# Patient Record
Sex: MALE | Race: WHITE | NOT HISPANIC OR LATINO | URBAN - NONMETROPOLITAN AREA
[De-identification: names, ages, dates, MRNs, and addresses within clinical notes are randomized per-mention and may not be internally consistent; named-entity substitution may affect disease eponyms.]

---

## 2022-03-17 ENCOUNTER — NURSE TRIAGE (OUTPATIENT)
Dept: CALL CENTER | Facility: HOSPITAL | Age: 72
End: 2022-03-17

## 2022-03-18 ENCOUNTER — HOSPITAL ENCOUNTER (INPATIENT)
Facility: HOSPITAL | Age: 72
LOS: 2 days | Discharge: HOME | End: 2022-03-20
Attending: EMERGENCY MEDICINE | Admitting: INTERNAL MEDICINE

## 2022-03-18 ENCOUNTER — APPOINTMENT (OUTPATIENT)
Dept: CT IMAGING | Facility: HOSPITAL | Age: 72
End: 2022-03-18

## 2022-03-18 ENCOUNTER — APPOINTMENT (OUTPATIENT)
Dept: GENERAL RADIOLOGY | Facility: HOSPITAL | Age: 72
End: 2022-03-18

## 2022-03-18 DIAGNOSIS — J96.01 ACUTE RESPIRATORY FAILURE WITH HYPOXIA: Primary | ICD-10-CM

## 2022-03-18 DIAGNOSIS — J10.1 INFLUENZA A: ICD-10-CM

## 2022-03-18 PROBLEM — R65.20 SEVERE SEPSIS: Status: ACTIVE | Noted: 2022-03-18

## 2022-03-18 PROBLEM — C91.10 CHRONIC LYMPHOCYTIC LEUKEMIA: Status: ACTIVE | Noted: 2022-03-18

## 2022-03-18 PROBLEM — A41.9 SEVERE SEPSIS: Status: ACTIVE | Noted: 2022-03-18

## 2022-03-18 LAB
A-A DO2: 33.8 MMHG (ref 0–300)
ALBUMIN SERPL-MCNC: 4.3 G/DL (ref 3.5–5.2)
ALBUMIN/GLOB SERPL: 1.6 G/DL
ALP SERPL-CCNC: 79 U/L (ref 39–117)
ALT SERPL W P-5'-P-CCNC: 22 U/L (ref 1–41)
ANION GAP SERPL CALCULATED.3IONS-SCNC: 13.5 MMOL/L (ref 5–15)
ARTERIAL PATENCY WRIST A: ABNORMAL
AST SERPL-CCNC: 17 U/L (ref 1–40)
ATMOSPHERIC PRESS: 725 MMHG
BASE EXCESS BLDA CALC-SCNC: 2.7 MMOL/L (ref 0–2)
BASOPHILS # BLD AUTO: 0.05 10*3/MM3 (ref 0–0.2)
BASOPHILS NFR BLD AUTO: 0.6 % (ref 0–1.5)
BDY SITE: ABNORMAL
BILIRUB SERPL-MCNC: 0.6 MG/DL (ref 0–1.2)
BILIRUB UR QL STRIP: NEGATIVE
BODY TEMPERATURE: 0 C
BUN SERPL-MCNC: 12 MG/DL (ref 8–23)
BUN/CREAT SERPL: 11.9 (ref 7–25)
CALCIUM SPEC-SCNC: 9.1 MG/DL (ref 8.6–10.5)
CHLORIDE SERPL-SCNC: 98 MMOL/L (ref 98–107)
CLARITY UR: CLEAR
CO2 BLDA-SCNC: 28.7 MMOL/L (ref 22–33)
CO2 SERPL-SCNC: 26.5 MMOL/L (ref 22–29)
COHGB MFR BLD: 1.2 % (ref 0–5)
COLOR UR: YELLOW
CREAT SERPL-MCNC: 1.01 MG/DL (ref 0.76–1.27)
CRP SERPL-MCNC: 8.88 MG/DL (ref 0–0.5)
D DIMER PPP FEU-MCNC: <0.27 MCGFEU/ML (ref 0–0.5)
D-LACTATE SERPL-SCNC: 2.3 MMOL/L (ref 0.5–2)
D-LACTATE SERPL-SCNC: 2.8 MMOL/L (ref 0.5–2)
DEPRECATED RDW RBC AUTO: 45.2 FL (ref 37–54)
EGFRCR SERPLBLD CKD-EPI 2021: 79 ML/MIN/1.73
EOSINOPHIL # BLD AUTO: 0.02 10*3/MM3 (ref 0–0.4)
EOSINOPHIL NFR BLD AUTO: 0.2 % (ref 0.3–6.2)
ERYTHROCYTE [DISTWIDTH] IN BLOOD BY AUTOMATED COUNT: 14.1 % (ref 12.3–15.4)
FLUAV RNA RESP QL NAA+PROBE: DETECTED
FLUBV RNA RESP QL NAA+PROBE: NOT DETECTED
GLOBULIN UR ELPH-MCNC: 2.7 GM/DL
GLUCOSE BLDC GLUCOMTR-MCNC: 268 MG/DL (ref 70–130)
GLUCOSE BLDC GLUCOMTR-MCNC: 279 MG/DL (ref 70–130)
GLUCOSE SERPL-MCNC: 221 MG/DL (ref 65–99)
GLUCOSE UR STRIP-MCNC: ABNORMAL MG/DL
HCO3 BLDA-SCNC: 27.4 MMOL/L (ref 20–26)
HCT VFR BLD AUTO: 42.7 % (ref 37.5–51)
HCT VFR BLD CALC: 43.2 % (ref 38–51)
HGB BLD-MCNC: 14.2 G/DL (ref 13–17.7)
HGB BLDA-MCNC: 14.1 G/DL (ref 14–18)
HGB UR QL STRIP.AUTO: NEGATIVE
HOLD SPECIMEN: NORMAL
HOLD SPECIMEN: NORMAL
IMM GRANULOCYTES # BLD AUTO: 0.04 10*3/MM3 (ref 0–0.05)
IMM GRANULOCYTES NFR BLD AUTO: 0.5 % (ref 0–0.5)
INHALED O2 CONCENTRATION: 21 %
KETONES UR QL STRIP: ABNORMAL
LEUKOCYTE ESTERASE UR QL STRIP.AUTO: NEGATIVE
LYMPHOCYTES # BLD AUTO: 0.44 10*3/MM3 (ref 0.7–3.1)
LYMPHOCYTES NFR BLD AUTO: 5.1 % (ref 19.6–45.3)
Lab: ABNORMAL
MCH RBC QN AUTO: 29.5 PG (ref 26.6–33)
MCHC RBC AUTO-ENTMCNC: 33.3 G/DL (ref 31.5–35.7)
MCV RBC AUTO: 88.8 FL (ref 79–97)
METHGB BLD QL: 0.2 % (ref 0–3)
MODALITY: ABNORMAL
MONOCYTES # BLD AUTO: 0.96 10*3/MM3 (ref 0.1–0.9)
MONOCYTES NFR BLD AUTO: 11.2 % (ref 5–12)
NEUTROPHILS NFR BLD AUTO: 7.06 10*3/MM3 (ref 1.7–7)
NEUTROPHILS NFR BLD AUTO: 82.4 % (ref 42.7–76)
NITRITE UR QL STRIP: NEGATIVE
NOTE: ABNORMAL
NRBC BLD AUTO-RTO: 0 /100 WBC (ref 0–0.2)
OXYHGB MFR BLDV: 91.4 % (ref 94–99)
PCO2 BLDA: 41.5 MM HG (ref 35–45)
PCO2 TEMP ADJ BLD: ABNORMAL MM[HG]
PH BLDA: 7.43 PH UNITS (ref 7.35–7.45)
PH UR STRIP.AUTO: 5.5 [PH] (ref 5–8)
PH, TEMP CORRECTED: ABNORMAL
PLATELET # BLD AUTO: 140 10*3/MM3 (ref 140–450)
PMV BLD AUTO: 9.2 FL (ref 6–12)
PO2 BLDA: 61.8 MM HG (ref 83–108)
PO2 TEMP ADJ BLD: ABNORMAL MM[HG]
POTASSIUM SERPL-SCNC: 3.7 MMOL/L (ref 3.5–5.2)
PROCALCITONIN SERPL-MCNC: 0.11 NG/ML (ref 0–0.25)
PROT SERPL-MCNC: 7 G/DL (ref 6–8.5)
PROT UR QL STRIP: NEGATIVE
QT INTERVAL: 300 MS
QTC INTERVAL: 433 MS
RBC # BLD AUTO: 4.81 10*6/MM3 (ref 4.14–5.8)
SAO2 % BLDCOA: 92.7 % (ref 94–99)
SARS-COV-2 RNA RESP QL NAA+PROBE: NOT DETECTED
SODIUM SERPL-SCNC: 138 MMOL/L (ref 136–145)
SP GR UR STRIP: >1.03 (ref 1–1.03)
TROPONIN T SERPL-MCNC: <0.01 NG/ML (ref 0–0.03)
UROBILINOGEN UR QL STRIP: ABNORMAL
VENTILATOR MODE: ABNORMAL
WBC NRBC COR # BLD: 8.57 10*3/MM3 (ref 3.4–10.8)
WHOLE BLOOD HOLD SPECIMEN: NORMAL
WHOLE BLOOD HOLD SPECIMEN: NORMAL

## 2022-03-18 PROCEDURE — 81003 URINALYSIS AUTO W/O SCOPE: CPT | Performed by: PHYSICIAN ASSISTANT

## 2022-03-18 PROCEDURE — 83050 HGB METHEMOGLOBIN QUAN: CPT

## 2022-03-18 PROCEDURE — 36415 COLL VENOUS BLD VENIPUNCTURE: CPT

## 2022-03-18 PROCEDURE — 87040 BLOOD CULTURE FOR BACTERIA: CPT | Performed by: STUDENT IN AN ORGANIZED HEALTH CARE EDUCATION/TRAINING PROGRAM

## 2022-03-18 PROCEDURE — 83605 ASSAY OF LACTIC ACID: CPT | Performed by: EMERGENCY MEDICINE

## 2022-03-18 PROCEDURE — 83605 ASSAY OF LACTIC ACID: CPT | Performed by: STUDENT IN AN ORGANIZED HEALTH CARE EDUCATION/TRAINING PROGRAM

## 2022-03-18 PROCEDURE — 25010000002 HEPARIN (PORCINE) PER 1000 UNITS: Performed by: PHYSICIAN ASSISTANT

## 2022-03-18 PROCEDURE — 85379 FIBRIN DEGRADATION QUANT: CPT | Performed by: NURSE PRACTITIONER

## 2022-03-18 PROCEDURE — 85025 COMPLETE CBC W/AUTO DIFF WBC: CPT | Performed by: STUDENT IN AN ORGANIZED HEALTH CARE EDUCATION/TRAINING PROGRAM

## 2022-03-18 PROCEDURE — 84145 PROCALCITONIN (PCT): CPT | Performed by: NURSE PRACTITIONER

## 2022-03-18 PROCEDURE — 94660 CPAP INITIATION&MGMT: CPT

## 2022-03-18 PROCEDURE — 84484 ASSAY OF TROPONIN QUANT: CPT | Performed by: NURSE PRACTITIONER

## 2022-03-18 PROCEDURE — 99284 EMERGENCY DEPT VISIT MOD MDM: CPT

## 2022-03-18 PROCEDURE — 94799 UNLISTED PULMONARY SVC/PX: CPT

## 2022-03-18 PROCEDURE — 25010000002 METHYLPREDNISOLONE PER 125 MG: Performed by: NURSE PRACTITIONER

## 2022-03-18 PROCEDURE — 80053 COMPREHEN METABOLIC PANEL: CPT | Performed by: STUDENT IN AN ORGANIZED HEALTH CARE EDUCATION/TRAINING PROGRAM

## 2022-03-18 PROCEDURE — 71250 CT THORAX DX C-: CPT

## 2022-03-18 PROCEDURE — 86140 C-REACTIVE PROTEIN: CPT | Performed by: NURSE PRACTITIONER

## 2022-03-18 PROCEDURE — 94640 AIRWAY INHALATION TREATMENT: CPT

## 2022-03-18 PROCEDURE — 63710000001 INSULIN ASPART PER 5 UNITS: Performed by: PHYSICIAN ASSISTANT

## 2022-03-18 PROCEDURE — 71045 X-RAY EXAM CHEST 1 VIEW: CPT

## 2022-03-18 PROCEDURE — 87636 SARSCOV2 & INF A&B AMP PRB: CPT | Performed by: STUDENT IN AN ORGANIZED HEALTH CARE EDUCATION/TRAINING PROGRAM

## 2022-03-18 PROCEDURE — 82962 GLUCOSE BLOOD TEST: CPT

## 2022-03-18 PROCEDURE — 99223 1ST HOSP IP/OBS HIGH 75: CPT | Performed by: PHYSICIAN ASSISTANT

## 2022-03-18 PROCEDURE — 93005 ELECTROCARDIOGRAM TRACING: CPT | Performed by: EMERGENCY MEDICINE

## 2022-03-18 PROCEDURE — 82375 ASSAY CARBOXYHB QUANT: CPT

## 2022-03-18 PROCEDURE — 93005 ELECTROCARDIOGRAM TRACING: CPT | Performed by: STUDENT IN AN ORGANIZED HEALTH CARE EDUCATION/TRAINING PROGRAM

## 2022-03-18 PROCEDURE — 82805 BLOOD GASES W/O2 SATURATION: CPT

## 2022-03-18 PROCEDURE — 36600 WITHDRAWAL OF ARTERIAL BLOOD: CPT

## 2022-03-18 RX ORDER — PANTOPRAZOLE SODIUM 40 MG/1
40 TABLET, DELAYED RELEASE ORAL DAILY
Status: CANCELLED | OUTPATIENT
Start: 2022-03-18

## 2022-03-18 RX ORDER — HEPARIN SODIUM 5000 [USP'U]/ML
5000 INJECTION, SOLUTION INTRAVENOUS; SUBCUTANEOUS EVERY 12 HOURS SCHEDULED
Status: DISCONTINUED | OUTPATIENT
Start: 2022-03-18 | End: 2022-03-20 | Stop reason: HOSPADM

## 2022-03-18 RX ORDER — AMLODIPINE BESYLATE 5 MG/1
10 TABLET ORAL DAILY
Status: CANCELLED | OUTPATIENT
Start: 2022-03-18

## 2022-03-18 RX ORDER — HYDROCHLOROTHIAZIDE 12.5 MG/1
12.5 TABLET ORAL DAILY
COMMUNITY
End: 2022-03-20 | Stop reason: HOSPADM

## 2022-03-18 RX ORDER — GABAPENTIN 300 MG/1
300 CAPSULE ORAL NIGHTLY
Status: DISCONTINUED | OUTPATIENT
Start: 2022-03-18 | End: 2022-03-20 | Stop reason: HOSPADM

## 2022-03-18 RX ORDER — DULOXETIN HYDROCHLORIDE 60 MG/1
60 CAPSULE, DELAYED RELEASE ORAL
COMMUNITY
End: 2022-03-18

## 2022-03-18 RX ORDER — OSELTAMIVIR PHOSPHATE 75 MG/1
75 CAPSULE ORAL EVERY 12 HOURS SCHEDULED
Status: DISCONTINUED | OUTPATIENT
Start: 2022-03-18 | End: 2022-03-18 | Stop reason: SDUPTHER

## 2022-03-18 RX ORDER — METOPROLOL SUCCINATE 50 MG/1
50 TABLET, EXTENDED RELEASE ORAL DAILY
COMMUNITY
Start: 2022-01-17

## 2022-03-18 RX ORDER — METOPROLOL SUCCINATE 25 MG/1
50 TABLET, EXTENDED RELEASE ORAL DAILY
Status: CANCELLED | OUTPATIENT
Start: 2022-03-18

## 2022-03-18 RX ORDER — GABAPENTIN 300 MG/1
300 CAPSULE ORAL NIGHTLY
COMMUNITY
Start: 2021-07-15 | End: 2022-07-15

## 2022-03-18 RX ORDER — PANTOPRAZOLE SODIUM 40 MG/1
40 TABLET, DELAYED RELEASE ORAL DAILY
COMMUNITY
Start: 2022-02-25

## 2022-03-18 RX ORDER — ASPIRIN 81 MG/1
81 TABLET ORAL EVERY OTHER DAY
Status: DISCONTINUED | OUTPATIENT
Start: 2022-03-19 | End: 2022-03-20 | Stop reason: HOSPADM

## 2022-03-18 RX ORDER — METHYLPREDNISOLONE SODIUM SUCCINATE 125 MG/2ML
125 INJECTION, POWDER, LYOPHILIZED, FOR SOLUTION INTRAMUSCULAR; INTRAVENOUS ONCE
Status: COMPLETED | OUTPATIENT
Start: 2022-03-18 | End: 2022-03-18

## 2022-03-18 RX ORDER — LOSARTAN POTASSIUM 50 MG/1
50 TABLET ORAL DAILY
COMMUNITY

## 2022-03-18 RX ORDER — ASPIRIN 81 MG/1
81 TABLET ORAL EVERY OTHER DAY
Status: CANCELLED | OUTPATIENT
Start: 2022-03-19

## 2022-03-18 RX ORDER — ASPIRIN 81 MG/1
81 TABLET ORAL EVERY OTHER DAY
COMMUNITY

## 2022-03-18 RX ORDER — OSELTAMIVIR PHOSPHATE 75 MG/1
75 CAPSULE ORAL EVERY 12 HOURS SCHEDULED
Status: DISCONTINUED | OUTPATIENT
Start: 2022-03-18 | End: 2022-03-20 | Stop reason: HOSPADM

## 2022-03-18 RX ORDER — MONTELUKAST SODIUM 10 MG/1
10 TABLET ORAL NIGHTLY
Status: CANCELLED | OUTPATIENT
Start: 2022-03-18

## 2022-03-18 RX ORDER — ATORVASTATIN CALCIUM 40 MG/1
40 TABLET, FILM COATED ORAL DAILY
Status: CANCELLED | OUTPATIENT
Start: 2022-03-18

## 2022-03-18 RX ORDER — NATEGLINIDE 120 MG/1
120 TABLET ORAL
COMMUNITY
Start: 2022-01-17 | End: 2022-03-18

## 2022-03-18 RX ORDER — SODIUM CHLORIDE 0.9 % (FLUSH) 0.9 %
10 SYRINGE (ML) INJECTION AS NEEDED
Status: DISCONTINUED | OUTPATIENT
Start: 2022-03-18 | End: 2022-03-20 | Stop reason: HOSPADM

## 2022-03-18 RX ORDER — AMLODIPINE BESYLATE 10 MG/1
10 TABLET ORAL DAILY
Status: DISCONTINUED | OUTPATIENT
Start: 2022-03-18 | End: 2022-03-20 | Stop reason: HOSPADM

## 2022-03-18 RX ORDER — METOPROLOL SUCCINATE 50 MG/1
50 TABLET, EXTENDED RELEASE ORAL DAILY
Status: DISCONTINUED | OUTPATIENT
Start: 2022-03-18 | End: 2022-03-20 | Stop reason: HOSPADM

## 2022-03-18 RX ORDER — HYDROCHLOROTHIAZIDE 25 MG/1
12.5 TABLET ORAL DAILY
Status: CANCELLED | OUTPATIENT
Start: 2022-03-18

## 2022-03-18 RX ORDER — ACETAMINOPHEN 325 MG/1
650 TABLET ORAL EVERY 6 HOURS PRN
Status: DISCONTINUED | OUTPATIENT
Start: 2022-03-18 | End: 2022-03-20 | Stop reason: HOSPADM

## 2022-03-18 RX ORDER — GABAPENTIN 300 MG/1
300 CAPSULE ORAL NIGHTLY
Status: CANCELLED | OUTPATIENT
Start: 2022-03-18

## 2022-03-18 RX ORDER — PANTOPRAZOLE SODIUM 40 MG/1
40 TABLET, DELAYED RELEASE ORAL DAILY
Status: DISCONTINUED | OUTPATIENT
Start: 2022-03-18 | End: 2022-03-20 | Stop reason: HOSPADM

## 2022-03-18 RX ORDER — LOSARTAN POTASSIUM 25 MG/1
50 TABLET ORAL DAILY
Status: CANCELLED | OUTPATIENT
Start: 2022-03-18

## 2022-03-18 RX ORDER — ACETAMINOPHEN 325 MG/1
650 TABLET ORAL EVERY 8 HOURS PRN
Status: CANCELLED | OUTPATIENT
Start: 2022-03-18

## 2022-03-18 RX ORDER — SODIUM CHLORIDE 9 MG/ML
75 INJECTION, SOLUTION INTRAVENOUS CONTINUOUS
Status: DISCONTINUED | OUTPATIENT
Start: 2022-03-18 | End: 2022-03-20 | Stop reason: HOSPADM

## 2022-03-18 RX ORDER — OMEGA-3S/DHA/EPA/FISH OIL/D3 300MG-1000
1000 CAPSULE ORAL DAILY
Status: DISCONTINUED | OUTPATIENT
Start: 2022-03-18 | End: 2022-03-20 | Stop reason: HOSPADM

## 2022-03-18 RX ORDER — SENNOSIDES 8.6 MG
650 CAPSULE ORAL EVERY 8 HOURS PRN
COMMUNITY

## 2022-03-18 RX ORDER — OSELTAMIVIR PHOSPHATE 75 MG/1
75 CAPSULE ORAL EVERY 12 HOURS SCHEDULED
Status: DISCONTINUED | OUTPATIENT
Start: 2022-03-18 | End: 2022-03-18

## 2022-03-18 RX ORDER — NICOTINE POLACRILEX 4 MG
15 LOZENGE BUCCAL
Status: DISCONTINUED | OUTPATIENT
Start: 2022-03-18 | End: 2022-03-20 | Stop reason: HOSPADM

## 2022-03-18 RX ORDER — ATORVASTATIN CALCIUM 40 MG/1
40 TABLET, FILM COATED ORAL DAILY
COMMUNITY

## 2022-03-18 RX ORDER — AMLODIPINE BESYLATE 10 MG/1
10 TABLET ORAL DAILY
COMMUNITY
Start: 2022-01-17

## 2022-03-18 RX ORDER — MELATONIN
1000 DAILY
Status: CANCELLED | OUTPATIENT
Start: 2022-03-18

## 2022-03-18 RX ORDER — DEXTROSE MONOHYDRATE 25 G/50ML
25 INJECTION, SOLUTION INTRAVENOUS
Status: DISCONTINUED | OUTPATIENT
Start: 2022-03-18 | End: 2022-03-20 | Stop reason: HOSPADM

## 2022-03-18 RX ORDER — ONDANSETRON 2 MG/ML
4 INJECTION INTRAMUSCULAR; INTRAVENOUS EVERY 6 HOURS PRN
Status: DISCONTINUED | OUTPATIENT
Start: 2022-03-18 | End: 2022-03-20 | Stop reason: HOSPADM

## 2022-03-18 RX ORDER — BENZONATATE 100 MG/1
100 CAPSULE ORAL 3 TIMES DAILY PRN
Status: DISCONTINUED | OUTPATIENT
Start: 2022-03-18 | End: 2022-03-18 | Stop reason: ALTCHOICE

## 2022-03-18 RX ORDER — SODIUM CHLORIDE 0.9 % (FLUSH) 0.9 %
10 SYRINGE (ML) INJECTION EVERY 12 HOURS SCHEDULED
Status: DISCONTINUED | OUTPATIENT
Start: 2022-03-18 | End: 2022-03-20 | Stop reason: HOSPADM

## 2022-03-18 RX ORDER — LOSARTAN POTASSIUM 50 MG/1
50 TABLET ORAL DAILY
Status: DISCONTINUED | OUTPATIENT
Start: 2022-03-18 | End: 2022-03-20 | Stop reason: HOSPADM

## 2022-03-18 RX ORDER — MONTELUKAST SODIUM 10 MG/1
10 TABLET ORAL NIGHTLY
Status: DISCONTINUED | OUTPATIENT
Start: 2022-03-18 | End: 2022-03-20 | Stop reason: HOSPADM

## 2022-03-18 RX ORDER — BENZONATATE 100 MG/1
200 CAPSULE ORAL 3 TIMES DAILY PRN
Status: DISCONTINUED | OUTPATIENT
Start: 2022-03-18 | End: 2022-03-20 | Stop reason: HOSPADM

## 2022-03-18 RX ORDER — NITROGLYCERIN 0.4 MG/1
0.4 TABLET SUBLINGUAL
Status: DISCONTINUED | OUTPATIENT
Start: 2022-03-18 | End: 2022-03-20 | Stop reason: HOSPADM

## 2022-03-18 RX ORDER — ATORVASTATIN CALCIUM 40 MG/1
40 TABLET, FILM COATED ORAL DAILY
Status: DISCONTINUED | OUTPATIENT
Start: 2022-03-18 | End: 2022-03-20 | Stop reason: HOSPADM

## 2022-03-18 RX ORDER — ACETAMINOPHEN 500 MG
1000 TABLET ORAL ONCE
Status: COMPLETED | OUTPATIENT
Start: 2022-03-18 | End: 2022-03-18

## 2022-03-18 RX ORDER — MELATONIN
1000 DAILY
COMMUNITY

## 2022-03-18 RX ORDER — IPRATROPIUM BROMIDE AND ALBUTEROL SULFATE 2.5; .5 MG/3ML; MG/3ML
3 SOLUTION RESPIRATORY (INHALATION)
Status: DISCONTINUED | OUTPATIENT
Start: 2022-03-18 | End: 2022-03-20 | Stop reason: HOSPADM

## 2022-03-18 RX ORDER — LOSARTAN POTASSIUM AND HYDROCHLOROTHIAZIDE 12.5; 5 MG/1; MG/1
1 TABLET ORAL DAILY
COMMUNITY
Start: 2021-12-21 | End: 2022-03-18

## 2022-03-18 RX ORDER — SODIUM CHLORIDE 0.9 % (FLUSH) 0.9 %
1-10 SYRINGE (ML) INJECTION AS NEEDED
Status: DISCONTINUED | OUTPATIENT
Start: 2022-03-18 | End: 2022-03-20 | Stop reason: HOSPADM

## 2022-03-18 RX ORDER — MONTELUKAST SODIUM 10 MG/1
10 TABLET ORAL NIGHTLY
COMMUNITY

## 2022-03-18 RX ORDER — GUAIFENESIN/DEXTROMETHORPHAN 100-10MG/5
5 SYRUP ORAL EVERY 4 HOURS PRN
Status: DISCONTINUED | OUTPATIENT
Start: 2022-03-18 | End: 2022-03-20 | Stop reason: HOSPADM

## 2022-03-18 RX ADMIN — OSELTAMIVIR PHOSPHATE 75 MG: 75 CAPSULE ORAL at 20:41

## 2022-03-18 RX ADMIN — MONTELUKAST SODIUM 10 MG: 10 TABLET, COATED ORAL at 20:41

## 2022-03-18 RX ADMIN — METHYLPREDNISOLONE SODIUM SUCCINATE 125 MG: 125 INJECTION, POWDER, FOR SOLUTION INTRAMUSCULAR; INTRAVENOUS at 07:51

## 2022-03-18 RX ADMIN — BENZONATATE 100 MG: 100 CAPSULE, LIQUID FILLED ORAL at 12:31

## 2022-03-18 RX ADMIN — ACETAMINOPHEN 1000 MG: 500 TABLET ORAL at 03:31

## 2022-03-18 RX ADMIN — GABAPENTIN 300 MG: 300 CAPSULE ORAL at 20:41

## 2022-03-18 RX ADMIN — SODIUM CHLORIDE 125 ML/HR: 9 INJECTION, SOLUTION INTRAVENOUS at 18:33

## 2022-03-18 RX ADMIN — SODIUM CHLORIDE 1000 ML: 9 INJECTION, SOLUTION INTRAVENOUS at 03:31

## 2022-03-18 RX ADMIN — HEPARIN SODIUM 5000 UNITS: 5000 INJECTION INTRAVENOUS; SUBCUTANEOUS at 20:41

## 2022-03-18 RX ADMIN — Medication 10 ML: at 20:42

## 2022-03-18 RX ADMIN — INSULIN ASPART 4 UNITS: 100 INJECTION, SOLUTION INTRAVENOUS; SUBCUTANEOUS at 17:29

## 2022-03-18 RX ADMIN — IPRATROPIUM BROMIDE AND ALBUTEROL SULFATE 3 ML: .5; 3 SOLUTION RESPIRATORY (INHALATION) at 19:12

## 2022-03-18 RX ADMIN — OSELTAMIVIR PHOSPHATE 75 MG: 75 CAPSULE ORAL at 04:30

## 2022-03-18 RX ADMIN — CHOLECALCIFEROL TAB 10 MCG (400 UNIT) 1000 UNITS: 10 TAB at 18:31

## 2022-03-18 NOTE — PLAN OF CARE
Goal Outcome Evaluation:  Plan of Care Reviewed With: patient        Progress: improving  Outcome Evaluation: patient doing well states he already feels better, no acute distress noted,  VVS, will continue POC.

## 2022-03-18 NOTE — ED PROVIDER NOTES
Subjective   Patient is a 71-year-old male with past medical history significant for diabetes mellitus, hypertension, hyperlipidemia, GERD.  He presents to the ED today with complaints of shortness of breath and cough.  He reports that he has had fatigue, body aches and fever that has been ongoing for the last 2 to 3 days.  Patient reports that he is currently traveling and staying here for revival at a [employer], he resides in [place]. He reports a few months ago he was diagnosed with Covid pneumonia and required hospitalization and required oxygen.  Patient reports that after being discharged from the hospital he was back to his baseline and has not required any oxygen since then.  Patient denies any chest pain, vomiting, diarrhea, abdominal pain, dysuria, flank pain, dizziness, headache, syncope/near syncope, focal weakness, back pain, neck pain, numbness/tingling or any other significant complaints.           Review of Systems   Constitutional: Positive for appetite change, chills, fatigue and fever.   HENT: Positive for congestion.    Eyes: Negative.    Respiratory: Positive for cough and shortness of breath.    Cardiovascular: Negative.  Negative for chest pain.   Gastrointestinal: Negative.  Negative for abdominal pain.   Endocrine: Negative.    Genitourinary: Negative.  Negative for dysuria.   Musculoskeletal: Positive for myalgias.   Skin: Negative.    Allergic/Immunologic: Negative.    Neurological: Negative.    Hematological: Negative.    Psychiatric/Behavioral: Negative.    All other systems reviewed and are negative.      Past Medical History:   Diagnosis Date   • Depression    • Diabetes mellitus (HCC)    • GERD (gastroesophageal reflux disease)    • Hyperlipidemia    • Hypertension        Allergies   Allergen Reactions   • Benadryl [Diphenhydramine] Hives   • Penicillins Hives       No past surgical history on file.    No family history on file.    Social History     Socioeconomic History   • Marital  status:            Objective   Physical Exam  Vitals and nursing note reviewed.   Constitutional:       General: He is not in acute distress.     Appearance: Normal appearance. He is well-developed. He is not diaphoretic.   HENT:      Head: Normocephalic and atraumatic.      Right Ear: External ear normal.      Left Ear: External ear normal.      Nose: Congestion present.      Mouth/Throat:      Mouth: Mucous membranes are moist.      Pharynx: Oropharynx is clear.   Eyes:      Conjunctiva/sclera: Conjunctivae normal.      Pupils: Pupils are equal, round, and reactive to light.   Neck:      Vascular: No JVD.      Trachea: No tracheal deviation.   Cardiovascular:      Rate and Rhythm: Normal rate and regular rhythm.      Heart sounds: Normal heart sounds. No murmur heard.  Pulmonary:      Effort: Pulmonary effort is normal. No respiratory distress.      Breath sounds: Decreased breath sounds present. No wheezing.   Abdominal:      General: Bowel sounds are normal.      Palpations: Abdomen is soft.      Tenderness: There is no abdominal tenderness.   Musculoskeletal:         General: No deformity. Normal range of motion.      Cervical back: Normal range of motion and neck supple.   Skin:     General: Skin is warm and dry.      Capillary Refill: Capillary refill takes less than 2 seconds.      Coloration: Skin is not pale.      Findings: No erythema or rash.   Neurological:      General: No focal deficit present.      Mental Status: He is alert and oriented to person, place, and time. Mental status is at baseline.      Cranial Nerves: No cranial nerve deficit.   Psychiatric:         Mood and Affect: Mood normal.         Behavior: Behavior normal.         Thought Content: Thought content normal.         Judgment: Judgment normal.         Procedures       Results for orders placed or performed during the hospital encounter of 03/18/22   COVID-19 and FLU A/B PCR - Swab, Nasopharynx    Specimen: Nasopharynx; Swab    Result Value Ref Range    COVID19 Not Detected Not Detected - Ref. Range    Influenza A PCR Detected (A) Not Detected    Influenza B PCR Not Detected Not Detected   Comprehensive Metabolic Panel    Specimen: Arm, Left; Blood   Result Value Ref Range    Glucose 221 (H) 65 - 99 mg/dL    BUN 12 8 - 23 mg/dL    Creatinine 1.01 0.76 - 1.27 mg/dL    Sodium 138 136 - 145 mmol/L    Potassium 3.7 3.5 - 5.2 mmol/L    Chloride 98 98 - 107 mmol/L    CO2 26.5 22.0 - 29.0 mmol/L    Calcium 9.1 8.6 - 10.5 mg/dL    Total Protein 7.0 6.0 - 8.5 g/dL    Albumin 4.30 3.50 - 5.20 g/dL    ALT (SGPT) 22 1 - 41 U/L    AST (SGOT) 17 1 - 40 U/L    Alkaline Phosphatase 79 39 - 117 U/L    Total Bilirubin 0.6 0.0 - 1.2 mg/dL    Globulin 2.7 gm/dL    A/G Ratio 1.6 g/dL    BUN/Creatinine Ratio 11.9 7.0 - 25.0    Anion Gap 13.5 5.0 - 15.0 mmol/L    eGFR 79.0 >60.0 mL/min/1.73   Lactic Acid, Plasma    Specimen: Arm, Left; Blood   Result Value Ref Range    Lactate 2.3 (C) 0.5 - 2.0 mmol/L   CBC Auto Differential    Specimen: Arm, Left; Blood   Result Value Ref Range    WBC 8.57 3.40 - 10.80 10*3/mm3    RBC 4.81 4.14 - 5.80 10*6/mm3    Hemoglobin 14.2 13.0 - 17.7 g/dL    Hematocrit 42.7 37.5 - 51.0 %    MCV 88.8 79.0 - 97.0 fL    MCH 29.5 26.6 - 33.0 pg    MCHC 33.3 31.5 - 35.7 g/dL    RDW 14.1 12.3 - 15.4 %    RDW-SD 45.2 37.0 - 54.0 fl    MPV 9.2 6.0 - 12.0 fL    Platelets 140 140 - 450 10*3/mm3    Neutrophil % 82.4 (H) 42.7 - 76.0 %    Lymphocyte % 5.1 (L) 19.6 - 45.3 %    Monocyte % 11.2 5.0 - 12.0 %    Eosinophil % 0.2 (L) 0.3 - 6.2 %    Basophil % 0.6 0.0 - 1.5 %    Immature Grans % 0.5 0.0 - 0.5 %    Neutrophils, Absolute 7.06 (H) 1.70 - 7.00 10*3/mm3    Lymphocytes, Absolute 0.44 (L) 0.70 - 3.10 10*3/mm3    Monocytes, Absolute 0.96 (H) 0.10 - 0.90 10*3/mm3    Eosinophils, Absolute 0.02 0.00 - 0.40 10*3/mm3    Basophils, Absolute 0.05 0.00 - 0.20 10*3/mm3    Immature Grans, Absolute 0.04 0.00 - 0.05 10*3/mm3    nRBC 0.0 0.0 - 0.2 /100 WBC    STAT Lactic Acid, Reflex    Specimen: Arm, Left; Blood   Result Value Ref Range    Lactate 2.8 (C) 0.5 - 2.0 mmol/L   Procalcitonin    Specimen: Arm, Left; Blood   Result Value Ref Range    Procalcitonin 0.11 0.00 - 0.25 ng/mL   C-reactive Protein    Specimen: Arm, Left; Blood   Result Value Ref Range    C-Reactive Protein 8.88 (H) 0.00 - 0.50 mg/dL   Blood Gas, Arterial With Co-Ox    Specimen: Arterial Blood   Result Value Ref Range    Site Left Brachial     Chet's Test N/A     pH, Arterial 7.428 7.350 - 7.450 pH units    pCO2, Arterial 41.5 35.0 - 45.0 mm Hg    pO2, Arterial 61.8 (L) 83.0 - 108.0 mm Hg    HCO3, Arterial 27.4 (H) 20.0 - 26.0 mmol/L    Base Excess, Arterial 2.7 (H) 0.0 - 2.0 mmol/L    O2 Saturation, Arterial 92.7 (L) 94.0 - 99.0 %    Hemoglobin, Blood Gas 14.1 14 - 18 g/dL    Hematocrit, Blood Gas 43.2 38.0 - 51.0 %    Oxyhemoglobin 91.4 (L) 94 - 99 %    Methemoglobin 0.20 0.00 - 3.00 %    Carboxyhemoglobin 1.2 0 - 5 %    A-a Gradiant 33.8 0.0 - 300.0 mmHg    CO2 Content 28.7 22 - 33 mmol/L    Temperature 0.0 C    Barometric Pressure for Blood Gas 725 mmHg    Modality Room Air     FIO2 21 %    Ventilator Mode NA     Note      Collected by [id number]     pH, Temp Corrected      pCO2, Temperature Corrected      pO2, Temperature Corrected     Troponin    Specimen: Arm, Left; Blood   Result Value Ref Range    Troponin T <0.010 0.000 - 0.030 ng/mL   D-dimer, Quantitative    Specimen: Arm, Left; Blood   Result Value Ref Range    D-Dimer, Quantitative <0.27 0.00 - 0.50 MCGFEU/mL   Green Top (Gel)   Result Value Ref Range    Extra Tube Hold for add-ons.    Lavender Top   Result Value Ref Range    Extra Tube hold for add-on    Gold Top - SST   Result Value Ref Range    Extra Tube Hold for add-ons.    Light Blue Top   Result Value Ref Range    Extra Tube hold for add-on        ED Course  ED Course as of 03/18/22 1522   Fri Mar 18, 2022   0216 EKG 0206 a.m., sinus tachycardia 125 bpm, , QRS 92, QTc 433,  left axis deviation.  No significant ST deviation T wave abnormalities concerning for acute ischemia.  Mildly delayed R wave progression. [KP]   0330 Patient spo2 noted to be 90-92% on room air. He reports feeling short of breath. His ABG reveals a Po2 of 61.8. Will place him on 2 L of oxygen. [MB]   0414 XR Chest 1 View  IMPRESSION:  No acute cardiopulmonary findings. [MB]   0734 Patient feeling better. He is satting 99% on 2 L. He reports cough and requesting tessalon perles. He denies any other needs. His vitals are stable. Wife at bedside. Waiting for admission to the hospital as there are no beds at this time.  [MB]   0874 Care endorsed to JAMAL CHO [MB]   1441 Call placed hospitalist possible admit. []   1521 Discussed care with Dr. Garner. Patient will be admitted.  []      ED Course User Index  [] Sergei Ballard PA-C  [] Cesar Chambers MD  [MB] Sarah Brown APRN                                                 [facility]    Final diagnoses:   Acute respiratory failure with hypoxia (HCC)   Influenza A       ED Disposition  ED Disposition     ED Disposition   Decision to Admit    Condition   --    Comment   --             No follow-up provider specified.       Medication List      ASK your doctor about these medications    metFORMIN 1000 MG tablet  Commonly known as: GLUCOPHAGE  Ask about: Which instructions should I use?             Sergei Ballard PA-C  03/18/22 4626

## 2022-03-18 NOTE — NURSING NOTE
Patient to room via wheelchair accompanied by ER staff and wife. A/Ox4, assessment and admission completed at this time. No acute distress noted safety measures taken will continue POC.

## 2022-03-18 NOTE — TELEPHONE ENCOUNTER
Spouse called in asking if 89% is a good 02 sat and she did check while on phone and states now 90% and no complaints of dyspnea. Caller states some sat's are highter 90's. Caller did have c-pap on and when taken off reporting 90's but did get a reading once of 89%. Caller states he has bad cough and some blood tinged sputum. Caller states just got over COVID two months ago. Caller states she gave him tylenol as temperature was over 101 and now 100.7. Caller states was in hospital for three days's with COVID pneumonia two month's ago.. Caller advised to ER for evaluation. Caller advised if should start having dyspnea, chest pain or 02 sat back in upper 80's upgrade to 911.      Reason for Disposition  • Patient sounds very sick or weak to the triager    Additional Information  • Negative: Shock suspected (e.g., cold/pale/clammy skin, too weak to stand, low BP, rapid pulse)  • Negative: Difficult to awaken or acting confused (e.g., disoriented, slurred speech)  • Negative: [1] Difficulty breathing AND [2] bluish lips, tongue or face  • Negative: New-onset rash with multiple purple (or blood-colored) spots or dots  • Negative: Sounds like a life-threatening emergency to the triager  • Negative: Fever in a cancer patient who is currently (or recently) receiving chemotherapy or radiation therapy, or cancer patient who has metastatic or end-stage cancer and is receiving palliative care  • Negative: Pregnant  • Negative: Postpartum (from 0 to 6 weeks after delivery)  • Negative: Fever onset within 24 hours of receiving vaccine  • Negative: [1] Fever AND [2] within 14 days of COVID-19 Exposure  • Other symptom is present, see that guideline  (e.g., symptoms of cough, runny nose, sore throat, earache, abdominal pain, diarrhea, vomiting)  • Negative: SEVERE difficulty breathing (e.g., struggling for each breath, speaks in single words)  • Negative: Bluish (or gray) lips or face now  • Negative: [1] Difficulty breathing AND  "[2] exposure to flames, smoke, or fumes  • Negative: [1] Stridor AND [2] difficulty breathing  • Negative: Sounds like a life-threatening emergency to the triager  • Negative: [1] Previous asthma attacks AND [2] this feels like asthma attack  • Negative: Dry cough (non-productive;  no sputum or minimal clear sputum)  • Negative: [1] MODERATE difficulty breathing (e.g., speaks in phrases, SOB even at rest, pulse 100-120) AND [2] still present when not coughing  • Negative: Chest pain  (Exception: MILD central chest pain, present only when coughing)    Answer Assessment - Initial Assessment Questions  1. TEMPERATURE: \"What is the most recent temperature?\"  \"How was it measured?\"       100.7  2. ONSET: \"When did the fever start?\"       Earlier today   3. SYMPTOMS: \"Do you have any other symptoms besides the fever?\"  (e.g., colds, headache, sore throat, earache, cough, rash, diarrhea, vomiting, abdominal pain)       Bad cough, fever   4. CAUSE: If there are no symptoms, ask: \"What do you think is causing the fever?\"         5. CONTACTS: \"Does anyone else in the family have an infection?\"       Denies   6. TREATMENT: \"What have you done so far to treat this fever?\" (e.g., medications)        7. IMMUNOCOMPROMISE: \"Do you have of the following: diabetes, HIV positive, splenectomy, cancer chemotherapy, chronic steroid treatment, transplant patient, etc.\"      He has lukemia   8. PREGNANCY: \"Is there any chance you are pregnant?\" \"When was your last menstrual period?\"        9. TRAVEL: \"Have you traveled out of the country in the last month?\" (e.g., travel history, exposures)    Answer Assessment - Initial Assessment Questions  1. ONSET: \"When did the cough begin?\"       Last night   2. SEVERITY: \"How bad is the cough today?\"         Bad   3. SPUTUM: \"Describe the color of your sputum\" (none, dry cough; clear, white, yellow, green)      Blood streaked   4. HEMOPTYSIS: \"Are you coughing up any blood?\" If so ask: \"How much?\" " "(flecks, streaks, tablespoons, etc.)      Streaked   5. DIFFICULTY BREATHING: \"Are you having difficulty breathing?\" If Yes, ask: \"How bad is it?\" (e.g., mild, moderate, severe)     - MILD: No SOB at rest, mild SOB with walking, speaks normally in sentences, can lay down, no retractions, pulse < 100.     - MODERATE: SOB at rest, SOB with minimal exertion and prefers to sit, cannot lie down flat, speaks in phrases, mild retractions, audible wheezing, pulse 100-120.     - SEVERE: Very SOB at rest, speaks in single words, struggling to breathe, sitting hunched forward, retractions, pulse > 120       Denies but sta 89% on c-pap   6. FEVER: \"Do you have a fever?\" If Yes, ask: \"What is your temperature, how was it measured, and when did it start?\"      100.7  7. CARDIAC HISTORY: \"Do you have any history of heart disease?\" (e.g., heart attack, congestive heart failure)       Denies   8. LUNG HISTORY: \"Do you have any history of lung disease?\"  (e.g., pulmonary embolus, asthma, emphysema)      History of COVID   9. PE RISK FACTORS: \"Do you have a history of blood clots?\" (or: recent major surgery, recent prolonged travel, bedridden)      Recent COVID   10. OTHER SYMPTOMS: \"Do you have any other symptoms?\" (e.g., runny nose, wheezing, chest pain)        Cough   11. PREGNANCY: \"Is there any chance you are pregnant?\" \"When was your last menstrual period?\"          12. TRAVEL: \"Have you traveled out of the country in the last month?\" (e.g., travel history, exposures)    Protocols used: COUGH - ACUTE PRODUCTIVE-ADULT-AH, FEVER-ADULT-AH      "

## 2022-03-18 NOTE — H&P
[facility] Medicine Services  History & Physical    Patient Identification:  Name:  Bro Prince  Age:  71 y.o.  Sex:  male  :  1950  MRN:  [id number]   Visit Number:  [id number]  Admit Date: 3/18/2022   Primary Care Physician:  Provider, No Known    Subjective     Chief complaint: Flu like symptoms, short of breath, cough, low oxygen saturation    History of presenting illness:      Bro Prince is a 71 y.o. male with past medical history significant for diabetes mellitus type 2, depression, GERD, hyperlipidemia, hypertension.  He was hospitalized in 2022 @ [facility] in [place] with bilateral pneumonia and upper respiratory panel positive for coronavirus OC 43 in addition to charted severe obstructive sleep apnea on CPAP    Mr. Prince presented to the ED of [facility] on 22 after midnight for further evaluation of flu symptoms for 2 days that are worsening in nature with fever, cough, and low oxygen oxygen saturation.  He reported additionally fatigue decreased appetite and headache.  He did report taking 1000 mg of Tylenol around midnight in addition to Tessalon Perles prior to presentation to emergency department.  Upon arrival to the ED, vital signs were T 99.9F, pulse 118, RR 22, and /68 with room air oxygen saturation of 93%.   Covid/flu swab was positive for influenza A.       Mr. Prince is evaluated resting comfortably sitting up in his bed in room 327 with his wife at bedside.  They are travelling through for revival in [place], [place].  Over the past 24-48 hours he developed worsening cough, fatigue, body aches, chills, fever at home of 101F, reported drops in oxygen saturation.  His wife report following this she prompted him to visit the ED.  They report some of the other visitors of the [facility] began developing flu like symptoms over the past few days as well. He does still sleep with CPAP, and is up to date on flu and COVID  vaccinations.  He reports his breathing and coughing are improving with tessalon perles.          ---------------------------------------------------------------------------------------------------------------------   Review of Systems   Constitutional: Positive for appetite change, chills, fatigue and fever.   HENT: Positive for congestion.    Eyes: Negative for pain and discharge.   Respiratory: Positive for cough, shortness of breath and wheezing.    Cardiovascular: Negative for chest pain and leg swelling.   Gastrointestinal: Negative for abdominal distention, diarrhea, nausea and vomiting.   Endocrine: Negative for cold intolerance and heat intolerance.   Genitourinary: Negative for difficulty urinating, dysuria and flank pain.   Musculoskeletal: Negative for arthralgias and gait problem.   Skin: Negative for color change and pallor.   Allergic/Immunologic: Negative for environmental allergies and food allergies.   Neurological: Negative for dizziness and headaches.   Hematological: Negative for adenopathy. Does not bruise/bleed easily.   Psychiatric/Behavioral: Negative for agitation and confusion.        ---------------------------------------------------------------------------------------------------------------------   Past Medical History:   Diagnosis Date   • Chronic lymphocytic leukemia (HCC) 03/18/2022    Formatting of this note might be different from the original. Diagnosed in 2008 , sees  Dr. Mijares   • COPD (chronic obstructive pulmonary disease) (HCC)    • Depression    • Diabetes mellitus (HCC)    • GERD (gastroesophageal reflux disease)    • Hyperlipidemia    • Hypertension      Past Surgical History:   Procedure Laterality Date   • COLONOSCOPY     • PROSTATE BIOPSY     • WRIST SURGERY       Family History   Problem Relation Age of Onset   • Heart disease Mother    • Heart disease Father    • Heart disease Brother    • Stroke Brother      Social History     Socioeconomic History   • Marital  status:    Tobacco Use   • Smoking status: Never Smoker   Substance and Sexual Activity   • Alcohol use: Never   • Drug use: Never     ---------------------------------------------------------------------------------------------------------------------   Allergies:  Benadryl [diphenhydramine] and Penicillins  ---------------------------------------------------------------------------------------------------------------------   Home medications:    Medications below are reported home medications pulling from within the system; at this time, these medications have not been reconciled unless otherwise specified and are in the verification process for further verifcation as current home medications.  Medications Prior to Admission   Medication Sig Dispense Refill Last Dose   • acetaminophen (TYLENOL) 650 MG 8 hr tablet Take 650 mg by mouth Every 8 (Eight) Hours As Needed.   Past Week at Unknown time   • amLODIPine (NORVASC) 10 MG tablet Take 10 mg by mouth Daily.   3/17/2022 at 0800   • aspirin 81 MG EC tablet Take 81 mg by mouth Every Other Day.   3/17/2022 at 0800   • atorvastatin (LIPITOR) 40 MG tablet Take 40 mg by mouth Daily.   3/17/2022 at 0800   • cholecalciferol (VITAMIN D3) 25 MCG (1000 UT) tablet Take 1,000 Units by mouth Daily.   3/17/2022 at 0800   • gabapentin (NEURONTIN) 300 MG capsule Take 300 mg by mouth Every Night.   3/17/2022 at 1900   • hydroCHLOROthiazide (HYDRODIURIL) 12.5 MG tablet Take 12.5 mg by mouth Daily.   3/17/2022 at 0800   • losartan (COZAAR) 50 MG tablet Take 50 mg by mouth Daily.   3/17/2022 at 0800   • metFORMIN (GLUCOPHAGE) 1000 MG tablet Take 1,000 mg by mouth 2 (Two) Times a Day With Meals.   3/17/2022 at 1900   • metoprolol succinate XL (TOPROL-XL) 50 MG 24 hr tablet Take 50 mg by mouth Daily.   3/17/2022 at 0800   • montelukast (SINGULAIR) 10 MG tablet Take 10 mg by mouth Every Night.   3/17/2022 at 1900   • pantoprazole (PROTONIX) 40 MG EC tablet Take 40 mg by mouth  Daily.   3/17/2022 at 0800       [facility] Scheduled Meds:  amLODIPine, 10 mg, Oral, Daily  [START ON 3/19/2022] aspirin, 81 mg, Oral, Every Other Day  atorvastatin, 40 mg, Oral, Daily  cholecalciferol, 1,000 Units, Oral, Daily  gabapentin, 300 mg, Oral, Nightly  heparin (porcine), 5,000 Units, Subcutaneous, Q12H  insulin aspart, 0-7 Units, Subcutaneous, TID AC  ipratropium-albuterol, 3 mL, Nebulization, 4x Daily - RT  losartan, 50 mg, Oral, Daily  metoprolol succinate XL, 50 mg, Oral, Daily  montelukast, 10 mg, Oral, Nightly  oseltamivir, 75 mg, Oral, Q12H  pantoprazole, 40 mg, Oral, Daily  sodium chloride, 10 mL, Intravenous, Q12H      sodium chloride, 125 mL/hr        Current listed hospital scheduled medications may not yet reflect those currently placed in orders that are signed and held awaiting patient's arrival to floor.   ---------------------------------------------------------------------------------------------------------------------     Objective     Vital Signs:  Temp:  [99 °F (37.2 °C)-100.2 °F (37.9 °C)] 99 °F (37.2 °C)  Heart Rate:  [] 101  Resp:  [20-22] 20  BP: (106-154)/(48-83) 154/78      03/18/22  0128   Weight: 106 kg (234 lb)     Body mass index is 36.65 kg/m².  ---------------------------------------------------------------------------------------------------------------------       Physical Exam  Vitals and nursing note reviewed.   Constitutional:       General: He is awake.      Appearance: Normal appearance. He is well-developed.   HENT:      Head: Atraumatic.   Eyes:      General: Lids are normal.      Conjunctiva/sclera:      Right eye: Right conjunctiva is not injected.      Left eye: Left conjunctiva is not injected.   Cardiovascular:      Rate and Rhythm: Normal rate and regular rhythm.      Heart sounds: S1 normal and S2 normal.   Pulmonary:      Effort: No tachypnea or bradypnea.      Breath sounds: Examination of the right-lower field reveals decreased breath sounds.  Examination of the left-lower field reveals decreased breath sounds. Decreased breath sounds present. No wheezing, rhonchi or rales.   Abdominal:      General: Bowel sounds are normal.      Palpations: Abdomen is soft.      Tenderness: There is no abdominal tenderness.   Musculoskeletal:      Cervical back: Full passive range of motion without pain. No edema or erythema.      Right lower leg: No swelling. No edema.      Left lower leg: No swelling. No edema.   Skin:     General: Skin is warm and moist.   Neurological:      Mental Status: He is alert and oriented to person, place, and time.   Psychiatric:         Attention and Perception: Attention normal.         Mood and Affect: Mood normal.         Behavior: Behavior is cooperative.           ---------------------------------------------------------------------------------------------------------------------  EKG:                  ---------------------------------------------------------------------------------------------------------------------   Results from last 7 days   Lab Units 03/18/22  0459 03/18/22  0155   CRP mg/dL  --  8.88*   LACTATE mmol/L 2.8* 2.3*   WBC 10*3/mm3  --  8.57   HEMOGLOBIN g/dL  --  14.2   HEMATOCRIT %  --  42.7   MCV fL  --  88.8   MCHC g/dL  --  33.3   PLATELETS 10*3/mm3  --  140     Results from last 7 days   Lab Units 03/18/22  0324   PH, ARTERIAL pH units 7.428   PO2 ART mm Hg 61.8*   PCO2, ARTERIAL mm Hg 41.5   HCO3 ART mmol/L 27.4*     Results from last 7 days   Lab Units 03/18/22 0155   SODIUM mmol/L 138   POTASSIUM mmol/L 3.7   CHLORIDE mmol/L 98   CO2 mmol/L 26.5   BUN mg/dL 12   CREATININE mg/dL 1.01   CALCIUM mg/dL 9.1   GLUCOSE mg/dL 221*   ALBUMIN g/dL 4.30   BILIRUBIN mg/dL 0.6   ALK PHOS U/L 79   AST (SGOT) U/L 17   ALT (SGPT) U/L 22   Estimated Creatinine Clearance: 76.8 mL/min (by C-G formula based on SCr of 1.01 mg/dL).  No results found for: AMMONIA  Results from last 7 days   Lab Units 03/18/22 0155   TROPONIN T  ng/mL <0.010                 No results found for: HGBA1C  No results found for: TSH, FREET4  No results found for: PREGTESTUR, PREGSERUM, HCG, HCGQUANT  Pain Management Panel    There is no flowsheet data to display.       No results found for: BLOODCX  No results found for: URINECX  No results found for: WOUNDCX  No results found for: STOOLCX      ---------------------------------------------------------------------------------------------------------------------  Imaging Results (Last 7 Days)     Procedure Component Value Units Date/Time    XR Chest 1 View [[id number]] Collected: 03/18/22 0410     Updated: 03/18/22 0412    Narrative:      CR Chest 1 Vw    INDICATION:   Cough and shortness of air today     COMPARISON:    None available.    FINDINGS:  Portable AP view(s) of the chest.  The heart and mediastinal contours are normal. The lungs are clear. No pneumothorax or pleural effusion.      Impression:      No acute cardiopulmonary findings.    Signer Name: Moses Crowder MD   Signed: 3/18/2022 4:10 AM   Workstation Name: [email or web address]    [facility]          Cultures:  No results found for: BLOODCX, URINECX, WOUNDCX, MRSACX, RESPCX, STOOLCX    I have personally reviewed the above radiology images and read the final radiology report on 03/18/22  ---------------------------------------------------------------------------------------------------------------------  Assessment / Plan     Active Hospital Problems    Diagnosis  POA   • Severe sepsis (HCC) [A41.9, R65.20]  Yes       ASSESSMENT/PLAN:    -Severe sepsis, present on admission, HR>90, RR>20, and influenza A with lactate>2:   -Immunosuppressed state with known CLL (has not been on chemotherapy for 2 years):   -Hx OC43 Coronavirus infection in January 2022 requiring hospitalization:   • Tamiflu added.   • MRSA swab nares added given influenza A with increased risk for MRSA PNA.   • CT chest without contrast pending.   • Procal negative.  "  • PRN antitussives on board with tessalon perles and robitussin.    • Scheduled inhalants on board.   • Peripheral blood cultures performed and pending.   • Antipyretics on board.   • Check UA.     -Reported Acute hypoxemic respiratory failure with reported drops in the mid 80s on room air in the ED:  -Mild persistent asthma:   -Documented prior history of calcified and noncalcified lung nodules:   -Severe KAILA on CPAP (sleep study in 06/2014):   · Treat flu A as outlined.   · Orders placed to titrate oxygen saturation greater than or equal to 90%.    · PFT in 12/2019 reportedly \"normal\" per review of outside record charting with PFT itself not available for viewing.   · Place order for CPAP use.   · Continuous cardiac monitoring.   · ABG reviewed with borderline paO2.      -Decreased oral intake/appetitie change:   · Hold home HCTZ.   · Sepsis bolus on board.   · PRN Zofran on board (QTc WNL).     -CLL diagnosed in 2008:   · Continue outpatient follow-up in [place].   · No longer on oral chemotherapy.     -Hyperglycemia with DM type 2:   • Given diabetes mellitus, fingerstick blood glucose monitoring has been ordered AC&HS.   • Sliding scale insulin has been ordered PRN.    • Hemoglobin a1c on March 01, 2022 noted to be 8.2 per PCP.   • Hypoglycemia protocol on board if needed.      -Essential HTN:   · Vitals per hospital protocol.   · Continue home Norvasc 10mg & home Losartan 50mg qd.   · Continue home Metoprolol XL 50mg daily.   · Hold home HCTZ given decreased oral intake.      -CAD,  Chest pain free at the time of evaluation:   -Hyperlipidemia:   · Negative stress test in 07/20/2021 @ OSH.    · Outpatient Cardiology visit note from 03/02/22 reviewed with lipid panel reviewed and 6 months ollow-up recommended.   · Continue outpatient statin.   · EKG without known acute ischemia.          ----------  -DVT prophylaxis: SQ Heparin  -Activity: Up with assist  -Expected length of stay: INPATIENT status due to the need " for care which can only be reasonably provided in an hospital setting such as aggressive/expedited ancillary services and/or consultation services, the necessity for IV medications, close physician monitoring and/or the possible need for procedures.  In such, I feel patient’s risk for adverse outcomes and need for care warrant INPATIENT evaluation and predict the patient’s care encounter to likely last beyond 2 midnights.  -Disposition: Plans to return home with wife at discharge    High risk secondary to severe sepsis criteria met on admission 2/2 influenza A, hx CLL, decreased appetite and dehydration, Hyperglycemia, Decreased oral intake        Mackenzie Delacruz PA-C  03/18/22  16:37 EDT  Pager # [phone]  ---------------------------------------------------------------------------------------------------------------------

## 2022-03-18 NOTE — ED NOTES
Patient took home dose of morning medications per MARQUIS Lino's instructions. Wife provided medications.

## 2022-03-19 LAB
ANION GAP SERPL CALCULATED.3IONS-SCNC: 10.2 MMOL/L (ref 5–15)
BASOPHILS # BLD AUTO: 0.02 10*3/MM3 (ref 0–0.2)
BASOPHILS NFR BLD AUTO: 0.3 % (ref 0–1.5)
BUN SERPL-MCNC: 16 MG/DL (ref 8–23)
BUN/CREAT SERPL: 18.8 (ref 7–25)
CALCIUM SPEC-SCNC: 8.9 MG/DL (ref 8.6–10.5)
CHLORIDE SERPL-SCNC: 103 MMOL/L (ref 98–107)
CO2 SERPL-SCNC: 27.8 MMOL/L (ref 22–29)
CREAT SERPL-MCNC: 0.85 MG/DL (ref 0.76–1.27)
CRP SERPL-MCNC: 8 MG/DL (ref 0–0.5)
DEPRECATED RDW RBC AUTO: 46.2 FL (ref 37–54)
EGFRCR SERPLBLD CKD-EPI 2021: 92.3 ML/MIN/1.73
EOSINOPHIL # BLD AUTO: 0.01 10*3/MM3 (ref 0–0.4)
EOSINOPHIL NFR BLD AUTO: 0.2 % (ref 0.3–6.2)
ERYTHROCYTE [DISTWIDTH] IN BLOOD BY AUTOMATED COUNT: 14 % (ref 12.3–15.4)
GLUCOSE BLDC GLUCOMTR-MCNC: 170 MG/DL (ref 70–130)
GLUCOSE BLDC GLUCOMTR-MCNC: 171 MG/DL (ref 70–130)
GLUCOSE BLDC GLUCOMTR-MCNC: 179 MG/DL (ref 70–130)
GLUCOSE BLDC GLUCOMTR-MCNC: 220 MG/DL (ref 70–130)
GLUCOSE SERPL-MCNC: 174 MG/DL (ref 65–99)
HCT VFR BLD AUTO: 38.8 % (ref 37.5–51)
HGB BLD-MCNC: 12.7 G/DL (ref 13–17.7)
IMM GRANULOCYTES # BLD AUTO: 0.01 10*3/MM3 (ref 0–0.05)
IMM GRANULOCYTES NFR BLD AUTO: 0.2 % (ref 0–0.5)
LYMPHOCYTES # BLD AUTO: 0.98 10*3/MM3 (ref 0.7–3.1)
LYMPHOCYTES NFR BLD AUTO: 16.5 % (ref 19.6–45.3)
MCH RBC QN AUTO: 29.5 PG (ref 26.6–33)
MCHC RBC AUTO-ENTMCNC: 32.7 G/DL (ref 31.5–35.7)
MCV RBC AUTO: 90.2 FL (ref 79–97)
MONOCYTES # BLD AUTO: 0.7 10*3/MM3 (ref 0.1–0.9)
MONOCYTES NFR BLD AUTO: 11.8 % (ref 5–12)
MRSA DNA SPEC QL NAA+PROBE: NEGATIVE
NEUTROPHILS NFR BLD AUTO: 4.22 10*3/MM3 (ref 1.7–7)
NEUTROPHILS NFR BLD AUTO: 71 % (ref 42.7–76)
NRBC BLD AUTO-RTO: 0 /100 WBC (ref 0–0.2)
PLATELET # BLD AUTO: 135 10*3/MM3 (ref 140–450)
PMV BLD AUTO: 9.8 FL (ref 6–12)
POTASSIUM SERPL-SCNC: 3.6 MMOL/L (ref 3.5–5.2)
RBC # BLD AUTO: 4.3 10*6/MM3 (ref 4.14–5.8)
S AUREUS DNA SPEC QL NAA+PROBE: NEGATIVE
SODIUM SERPL-SCNC: 141 MMOL/L (ref 136–145)
WBC NRBC COR # BLD: 5.94 10*3/MM3 (ref 3.4–10.8)

## 2022-03-19 PROCEDURE — 25010000002 HEPARIN (PORCINE) PER 1000 UNITS: Performed by: PHYSICIAN ASSISTANT

## 2022-03-19 PROCEDURE — 94799 UNLISTED PULMONARY SVC/PX: CPT

## 2022-03-19 PROCEDURE — 86140 C-REACTIVE PROTEIN: CPT | Performed by: PHYSICIAN ASSISTANT

## 2022-03-19 PROCEDURE — 87640 STAPH A DNA AMP PROBE: CPT | Performed by: PHYSICIAN ASSISTANT

## 2022-03-19 PROCEDURE — 99233 SBSQ HOSP IP/OBS HIGH 50: CPT | Performed by: PHYSICIAN ASSISTANT

## 2022-03-19 PROCEDURE — 87641 MR-STAPH DNA AMP PROBE: CPT | Performed by: PHYSICIAN ASSISTANT

## 2022-03-19 PROCEDURE — 82962 GLUCOSE BLOOD TEST: CPT

## 2022-03-19 PROCEDURE — 63710000001 INSULIN ASPART PER 5 UNITS: Performed by: PHYSICIAN ASSISTANT

## 2022-03-19 PROCEDURE — 80048 BASIC METABOLIC PNL TOTAL CA: CPT | Performed by: PHYSICIAN ASSISTANT

## 2022-03-19 PROCEDURE — 85025 COMPLETE CBC W/AUTO DIFF WBC: CPT | Performed by: PHYSICIAN ASSISTANT

## 2022-03-19 RX ADMIN — IPRATROPIUM BROMIDE AND ALBUTEROL SULFATE 3 ML: .5; 3 SOLUTION RESPIRATORY (INHALATION) at 07:28

## 2022-03-19 RX ADMIN — ASPIRIN 81 MG: 81 TABLET, COATED ORAL at 09:04

## 2022-03-19 RX ADMIN — AMLODIPINE BESYLATE 10 MG: 10 TABLET ORAL at 09:03

## 2022-03-19 RX ADMIN — MONTELUKAST SODIUM 10 MG: 10 TABLET, COATED ORAL at 22:19

## 2022-03-19 RX ADMIN — OSELTAMIVIR PHOSPHATE 75 MG: 75 CAPSULE ORAL at 09:06

## 2022-03-19 RX ADMIN — HEPARIN SODIUM 5000 UNITS: 5000 INJECTION INTRAVENOUS; SUBCUTANEOUS at 22:19

## 2022-03-19 RX ADMIN — GABAPENTIN 300 MG: 300 CAPSULE ORAL at 22:19

## 2022-03-19 RX ADMIN — METFORMIN HYDROCHLORIDE 1000 MG: 500 TABLET ORAL at 18:00

## 2022-03-19 RX ADMIN — INSULIN ASPART 4 UNITS: 100 INJECTION, SOLUTION INTRAVENOUS; SUBCUTANEOUS at 12:33

## 2022-03-19 RX ADMIN — CHOLECALCIFEROL TAB 10 MCG (400 UNIT) 1000 UNITS: 10 TAB at 09:05

## 2022-03-19 RX ADMIN — SODIUM CHLORIDE 75 ML/HR: 9 INJECTION, SOLUTION INTRAVENOUS at 12:27

## 2022-03-19 RX ADMIN — METFORMIN HYDROCHLORIDE 1000 MG: 500 TABLET ORAL at 12:26

## 2022-03-19 RX ADMIN — METOPROLOL SUCCINATE 50 MG: 50 TABLET, EXTENDED RELEASE ORAL at 09:02

## 2022-03-19 RX ADMIN — OSELTAMIVIR PHOSPHATE 75 MG: 75 CAPSULE ORAL at 22:19

## 2022-03-19 RX ADMIN — BENZONATATE 200 MG: 100 CAPSULE ORAL at 00:48

## 2022-03-19 RX ADMIN — Medication 10 ML: at 09:01

## 2022-03-19 RX ADMIN — IPRATROPIUM BROMIDE AND ALBUTEROL SULFATE 3 ML: .5; 3 SOLUTION RESPIRATORY (INHALATION) at 19:28

## 2022-03-19 RX ADMIN — LOSARTAN POTASSIUM 50 MG: 50 TABLET, FILM COATED ORAL at 09:04

## 2022-03-19 RX ADMIN — BENZONATATE 200 MG: 100 CAPSULE ORAL at 16:10

## 2022-03-19 RX ADMIN — INSULIN ASPART 2 UNITS: 100 INJECTION, SOLUTION INTRAVENOUS; SUBCUTANEOUS at 08:59

## 2022-03-19 RX ADMIN — PANTOPRAZOLE SODIUM 40 MG: 40 TABLET, DELAYED RELEASE ORAL at 09:04

## 2022-03-19 RX ADMIN — SODIUM CHLORIDE 125 ML/HR: 9 INJECTION, SOLUTION INTRAVENOUS at 04:30

## 2022-03-19 RX ADMIN — INSULIN ASPART 2 UNITS: 100 INJECTION, SOLUTION INTRAVENOUS; SUBCUTANEOUS at 18:01

## 2022-03-19 RX ADMIN — IPRATROPIUM BROMIDE AND ALBUTEROL SULFATE 3 ML: .5; 3 SOLUTION RESPIRATORY (INHALATION) at 00:31

## 2022-03-19 RX ADMIN — ATORVASTATIN CALCIUM 40 MG: 40 TABLET, FILM COATED ORAL at 09:03

## 2022-03-19 RX ADMIN — IPRATROPIUM BROMIDE AND ALBUTEROL SULFATE 3 ML: .5; 3 SOLUTION RESPIRATORY (INHALATION) at 13:00

## 2022-03-19 RX ADMIN — HEPARIN SODIUM 5000 UNITS: 5000 INJECTION INTRAVENOUS; SUBCUTANEOUS at 09:06

## 2022-03-19 NOTE — PROGRESS NOTES
Patient Identification:  Name:  Bro Prince  Age:  71 y.o.  Sex:  male  :  1950  MRN:  [id number]  Visit Number:  [id number]  Primary Care Provider:  Provider, No Known    Length of stay:  1    Subjective/Interval History/Consultants/Procedures     Chief complaint: Follow-up Influenza A    HPI/Hospital course:     Bro Prince is a 71 y.o. male with past medical history significant for diabetes mellitus type 2, depression, GERD, hyperlipidemia, hypertension.  He was hospitalized in 2022 @ [facility] in [place] with bilateral pneumonia and upper respiratory panel positive for coronavirus OC 43 in addition to charted severe obstructive sleep apnea on CPAP.  He presented to the ED of [facility] after midnight on  with flu like symptoms.  He was diagnosed with influenza A and experienced reported hypoxia in the ED. He was admitted for further evaluation and treatment.     Mr. Prince was started on Tamiflu in addition to inhalant regimen and antitussives.  Supplemental oxygen was utilized with goal to titrate O2 >90%. CT chest was obtained and was unremarkable.  MRSA swab of nares was performed as well and was found to be negative.       Subjective:      Mr. Prince was evaluated resting comfortably in bed. He reports his breathing has improved since admission.  He reports his appetite has also improved as well as improving body aches.   We discuss attempting to wean to room air today and evaluate toleration. Ambulatory abilities improving with independently ambulating to the restroom without significant weakness.      We also discuss discharge planning. He reports he plans to return home immediately after his hospital discharge.  We discuss possible discharge in the morning if he continues to do well.  His wife has went back to their hotel room for the evening.        ----------------------------------------------------------------------------------------------------------------------  [facility] Meds:  amLODIPine, 10 mg, Oral, Daily  aspirin, 81 mg, Oral, Every Other Day  atorvastatin, 40 mg, Oral, Daily  cholecalciferol, 1,000 Units, Oral, Daily  gabapentin, 300 mg, Oral, Nightly  heparin (porcine), 5,000 Units, Subcutaneous, Q12H  insulin aspart, 0-9 Units, Subcutaneous, TID AC  ipratropium-albuterol, 3 mL, Nebulization, 4x Daily - RT  losartan, 50 mg, Oral, Daily  metFORMIN, 1,000 mg, Oral, BID With Meals  metoprolol succinate XL, 50 mg, Oral, Daily  montelukast, 10 mg, Oral, Nightly  oseltamivir, 75 mg, Oral, Q12H  pantoprazole, 40 mg, Oral, Daily  sodium chloride, 10 mL, Intravenous, Q12H      sodium chloride, 75 mL/hr, Last Rate: 125 mL/hr (03/19/22 6040)      ----------------------------------------------------------------------------------------------------------------------      Objective     Vital Signs:  Temp:  [97.5 °F (36.4 °C)-99.1 °F (37.3 °C)] 97.7 °F (36.5 °C)  Heart Rate:  [] 83  Resp:  [16-20] 18  BP: (106-154)/(48-83) 127/74      03/18/22  0128 03/18/22  1700   Weight: 106 kg (234 lb) 104 kg (229 lb 7 oz)     Body mass index is 35.93 kg/m².    Intake/Output Summary (Last 24 hours) at 3/19/2022 1026  Last data filed at 3/19/2022 0858  Gross per 24 hour   Intake 1340 ml   Output 1100 ml   Net 240 ml     I/O this shift:  In: -   Out: 450 [Urine:450]  Diet Regular; Consistent Carbohydrate  ----------------------------------------------------------------------------------------------------------------------    Physical Exam  Vitals and nursing note reviewed.   Constitutional:       General: He is awake.      Appearance: Normal appearance.      Interventions: Nasal cannula in place.   HENT:      Head: Normocephalic and atraumatic.   Eyes:      General: Lids are normal.      Conjunctiva/sclera:      Right eye: Right conjunctiva is not injected.       Left eye: Left conjunctiva is not injected.   Cardiovascular:      Rate and Rhythm: Normal rate and regular rhythm.      Heart sounds: S1 normal and S2 normal.   Pulmonary:      Effort: No tachypnea, bradypnea, accessory muscle usage or respiratory distress.      Breath sounds: Examination of the right-lower field reveals decreased breath sounds. Examination of the left-lower field reveals decreased breath sounds. Decreased breath sounds present. No wheezing, rhonchi or rales.      Comments: Speaking in full sentences without significant shortness of breath.   Abdominal:      General: Bowel sounds are normal.      Palpations: Abdomen is soft.      Tenderness: There is no abdominal tenderness.   Musculoskeletal:      Right lower leg: No swelling. No edema.      Left lower leg: No swelling. No edema.   Skin:     General: Skin is warm and dry.   Neurological:      Mental Status: He is alert and oriented to person, place, and time.      Motor: No weakness or tremor.   Psychiatric:         Attention and Perception: Attention normal.         Mood and Affect: Mood normal.         Behavior: Behavior is cooperative.              ----------------------------------------------------------------------------------------------------------------------  Tele:        ----------------------------------------------------------------------------------------------------------------------  Results from last 7 days   Lab Units 03/18/22  0155   TROPONIN T ng/mL <0.010     Results from last 7 days   Lab Units 03/18/22  0459 03/18/22  0155   CRP mg/dL  --  8.88*   LACTATE mmol/L 2.8* 2.3*   WBC 10*3/mm3  --  8.57   HEMOGLOBIN g/dL  --  14.2   HEMATOCRIT %  --  42.7   MCV fL  --  88.8   MCHC g/dL  --  33.3   PLATELETS 10*3/mm3  --  140     Results from last 7 days   Lab Units 03/18/22  0324   PH, ARTERIAL pH units 7.428   PO2 ART mm Hg 61.8*   PCO2, ARTERIAL mm Hg 41.5   HCO3 ART mmol/L 27.4*     Results from last 7 days   Lab Units  03/19/22  0751 03/18/22  0155   SODIUM mmol/L 141 138   POTASSIUM mmol/L 3.6 3.7   CHLORIDE mmol/L 103 98   CO2 mmol/L 27.8 26.5   BUN mg/dL 16 12   CREATININE mg/dL 0.85 1.01   CALCIUM mg/dL 8.9 9.1   GLUCOSE mg/dL 174* 221*   ALBUMIN g/dL  --  4.30   BILIRUBIN mg/dL  --  0.6   ALK PHOS U/L  --  79   AST (SGOT) U/L  --  17   ALT (SGPT) U/L  --  22   Estimated Creatinine Clearance: 90.3 mL/min (by C-G formula based on SCr of 0.85 mg/dL).  No results found for: AMMONIA      Blood Culture   Date Value Ref Range Status   03/18/2022 No growth at 24 hours  Preliminary   03/18/2022 No growth at 24 hours  Preliminary     No results found for: URINECX  No results found for: WOUNDCX  No results found for: STOOLCX  ----------------------------------------------------------------------------------------------------------------------  Imaging Results (Last 24 Hours)     Procedure Component Value Units Date/Time    CT Chest Without Contrast Diagnostic [[id number]] Collected: 03/18/22 2023     Updated: 03/18/22 2025    Narrative:      CT Chest WO    INDICATION:   Acute onset of shortness of breath. Hypoxia.    TECHNIQUE:   CT of the thorax without IV contrast. Coronal and sagittal reconstructions were obtained.  Radiation dose reduction techniques included automated exposure control or exposure modulation based on body size. Count of known CT and cardiac nuc med studies  performed in previous 12 months: 0.     COMPARISON:   None available.    FINDINGS:  Lungs are free of acute infiltrates. No pleural fluid. No pneumothorax.    The heart size is normal. Coronary artery calcifications. No acute thoracic abnormalities. No threshold mediastinal or hilar adenopathy. No endobronchial lesions.    Limited noncontrasted imaging through the upper abdomen demonstrates multiple hepatic cysts in the left lobe. No clearly acute radiographic findings. Small hiatal hernia. Limited scanning through the thoracic inlet shows no acute  findings.    Regional osseous structures show no acute or aggressive bone lesions.      Impression:      1.  No clearly acute infiltrates demonstrated. No pleural fluid. No pneumothorax.    2.  No threshold mediastinal or hilar adenopathy.    3.  There are what is likely multiple hepatic cysts in the left lobe of the liver. Clinical aspects of the case will determine if hepatic ultrasound could provide additional information.    Signer Name: Bro Fontanez MD   Signed: 3/18/2022 8:23 PM   Workstation Name: [id number]    [facility]        ----------------------------------------------------------------------------------------------------------------------   I have reviewed the above laboratory values for 03/19/22    Assessment/Plan     Active Hospital Problems    Diagnosis  POA   • Severe sepsis (HCC) [A41.9, R65.20]  Yes         ASSESSMENT/PLAN:    -Severe sepsis, present on admission, HR>90, RR>20, and influenza A with lactate>2:   -Immunosuppressed state with known CLL (has not been on chemotherapy for 2 years):    · Tamiflu continued.   · MRSA swab nares negative.  · CT chest without contrast unremarkable upon review.   · Procal negative.   · PRN antitussives on board with tessalon perles and robitussin.    · Scheduled inhalants on board.   · Peripheral blood cultures performed and without known growth thus far.   · Antipyretics on board.   · UA not impressive for UTI.   · Possibly dc in the next 24 hours if he continues to do well.      -Reported Acute hypoxemic respiratory failure with reported drops in the mid 80s on room air in the ED:  -Mild persistent asthma:   -Documented prior history of calcified and noncalcified lung nodules:   -Severe KAILA on CPAP (sleep study in 06/2014):   · Treat flu A as outlined.   · Orders placed to titrate oxygen saturation greater than or equal to 90%.  Attempt to wean to room air today. Check resting pulse ox and ambulatory pulse ox on room air.  "  · PFT in 12/2019 reportedly \"normal\" per review of outside record charting with PFT itself not available for viewing.   · Place order for CPAP use.   · Continuous cardiac monitoring.   · ABG reviewed with borderline paO2.       -Decreased oral intake/appetitie change:   · Hold home HCTZ.   · Sepsis bolus on board.   · UA specific gravity increased on 03/18 with poor oral intake prior to admission.   · Decrease IV fluids from 125 cc/hr to 75 cc/hr.   · PRN Zofran on board (QTc WNL).      -CLL diagnosed in 2008:   · Continue outpatient follow-up in [place].   · No longer on oral chemotherapy.      -Hyperglycemia with DM type 2:   · Given diabetes mellitus, fingerstick blood glucose monitoring has been ordered AC&HS.   · Sliding scale insulin has been ordered PRN (dose increased to low-moderate).   · Home Metformin 1000mg BID restarted with hyperglycemia and with creatinine WNL.   · Hemoglobin a1c on March 01, 2022 noted to be 8.2 per PCP.   · Hypoglycemia protocol on board if needed.       -Essential HTN:   · Vitals per hospital protocol.   · Continue home Norvasc 10mg & home Losartan 50mg qd.   · Continue home Metoprolol XL 50mg daily.   · Hold home HCTZ given decreased oral intake.        -CAD,  Chest pain free at the time of evaluation:   -Hyperlipidemia:   · Negative stress test in 07/20/2021 @ OSH.    · Outpatient Cardiology visit note from 03/02/22 reviewed with lipid panel reviewed and 6 months ollow-up recommended.   · Continue outpatient statin.   · EKG without known acute ischemia.        -----------  -DVT prophylaxis: SQ Heparin  -GI prophylaxis: PPI  -Activity: As tolerated  -Disposition plans/anticipated needs: Home in the next 24-48 hours  -Diet:   Dietary Orders (From admission, onward)     Start     Ordered    03/18/22 0726  Diet Regular; Consistent Carbohydrate  Diet Effective Now        Question Answer Comment   Diet Texture / Consistency Regular    Common Modifiers Consistent Carbohydrate        " 03/18/22 0725    03/18/22 0726  DIET MESSAGE Requesting a diet tray for patient's family member.  Once        Comments: Requesting a diet tray for patient's family member.    03/18/22 0725                -Home supplemental O2: Not on home O2; has home CPAP      The patient is high risk due to the following diagnoses/reasons:  Severe sepsis 2/2 influenza A, reported hypoxemic respiratory failure, decreased oral intake, hyperglycemia with known DM 2        Mackenzie Delacruz PA-C  03/19/22  10:26 EDT  Pager # [phone]

## 2022-03-19 NOTE — PAYOR COMM NOTE
"[facility]  [facility]  PHONE  [phone]  FAX  [phone]  NPI:  [id number]    REQUEST TRACKING ID:  [id number]    Citlali Prince (71 y.o. Male)             Date of Birth   1950    Social Security Number       Address   [address] [facility] [address]    Home Phone   [phone]    MRN   [id number]       Rastafari   Jewish    Marital Status                               Admission Date   3/18/22    Admission Type   Emergency    Admitting Provider   Stefano Garner DO    Attending Provider   Stefano Garner DO    Department, Room/Bed   [address], 3327/1P       Discharge Date       Discharge Disposition       Discharge Destination                               Attending Provider: Stefano Garner DO    Allergies: Benadryl [Diphenhydramine], Penicillins    Isolation: Droplet   Infection: Influenza (03/18/22)   Code Status: CPR   Advance Care Planning Activity    Ht: 170.2 cm (67\")   Wt: 104 kg (229 lb 7 oz)    Admission Cmt: None   Principal Problem: None                Active Insurance as of 3/18/2022     Primary Coverage     Payor Plan Insurance Group Employer/Plan Group    ANTHEM MEDICARE REPLACEMENT ANTHEM MEDICARE ADVANTAGE [id number]     Payor Plan Address Payor Plan Phone Number Payor Plan Fax Number Effective Dates    [address] [phone]  1/1/2022 - None Entered    [facility] [address]       Subscriber Name Subscriber Birth Date Member ID       CITLALI PRINCE 1950 [id number]                 Emergency Contacts      (Rel.) Home Phone Work Phone Mobile Phone    MARGARITA PRINCE (Spouse) [phone] -- --               History & Physical      Mackenzie Delacruz PA-C at 03/18/22 1523     Attestation signed by Stefano Garner DO at 03/18/22 1800    I have reviewed this documentation and agree. Pt seen and examined with SARI Hurtado. Treatment plan discussed with IAN, patient and his wife.        "                [facility] Medicine Services  History & Physical    Patient Identification:  Name:  Bro Prince  Age:  71 y.o.  Sex:  male  :  1950  MRN:  [id number]   Visit Number:  [id number]  Admit Date: 3/18/2022   Primary Care Physician:  Provider, No Known    Subjective     Chief complaint: Flu like symptoms, short of breath, cough, low oxygen saturation    History of presenting illness:      Bro Prince is a 71 y.o. male with past medical history significant for diabetes mellitus type 2, depression, GERD, hyperlipidemia, hypertension.  He was hospitalized in 2022 @ [facility] in [place] with bilateral pneumonia and upper respiratory panel positive for coronavirus OC 43 in addition to charted severe obstructive sleep apnea on CPAP    Mr. Prince presented to the ED of [facility] on 22 after midnight for further evaluation of flu symptoms for 2 days that are worsening in nature with fever, cough, and low oxygen oxygen saturation.  He reported additionally fatigue decreased appetite and headache.  He did report taking 1000 mg of Tylenol around midnight in addition to Tessalon Perles prior to presentation to emergency department.  Upon arrival to the ED, vital signs were T 99.9F, pulse 118, RR 22, and /68 with room air oxygen saturation of 93%.   Covid/flu swab was positive for influenza A.       Mr. Prince is evaluated resting comfortably sitting up in his bed in room 327 with his wife at bedside.  They are travelling through for revival in [place], [place].  Over the past 24-48 hours he developed worsening cough, fatigue, body aches, chills, fever at home of 101F, reported drops in oxygen saturation.  His wife report following this she prompted him to visit the ED.  They report some of the other visitors of the [facility] began developing flu like symptoms over the past few days as well. He does still sleep with CPAP, and is up to date on flu and COVID  vaccinations.  He reports his breathing and coughing are improving with tessalon perles.          ---------------------------------------------------------------------------------------------------------------------   Review of Systems   Constitutional: Positive for appetite change, chills, fatigue and fever.   HENT: Positive for congestion.    Eyes: Negative for pain and discharge.   Respiratory: Positive for cough, shortness of breath and wheezing.    Cardiovascular: Negative for chest pain and leg swelling.   Gastrointestinal: Negative for abdominal distention, diarrhea, nausea and vomiting.   Endocrine: Negative for cold intolerance and heat intolerance.   Genitourinary: Negative for difficulty urinating, dysuria and flank pain.   Musculoskeletal: Negative for arthralgias and gait problem.   Skin: Negative for color change and pallor.   Allergic/Immunologic: Negative for environmental allergies and food allergies.   Neurological: Negative for dizziness and headaches.   Hematological: Negative for adenopathy. Does not bruise/bleed easily.   Psychiatric/Behavioral: Negative for agitation and confusion.        ---------------------------------------------------------------------------------------------------------------------   Past Medical History:   Diagnosis Date   • Chronic lymphocytic leukemia (HCC) 03/18/2022    Formatting of this note might be different from the original. Diagnosed in 2008 , sees  Dr. Mijares   • COPD (chronic obstructive pulmonary disease) (HCC)    • Depression    • Diabetes mellitus (HCC)    • GERD (gastroesophageal reflux disease)    • Hyperlipidemia    • Hypertension      Past Surgical History:   Procedure Laterality Date   • COLONOSCOPY     • PROSTATE BIOPSY     • WRIST SURGERY       Family History   Problem Relation Age of Onset   • Heart disease Mother    • Heart disease Father    • Heart disease Brother    • Stroke Brother      Social History     Socioeconomic History   • Marital  status:    Tobacco Use   • Smoking status: Never Smoker   Substance and Sexual Activity   • Alcohol use: Never   • Drug use: Never     ---------------------------------------------------------------------------------------------------------------------   Allergies:  Benadryl [diphenhydramine] and Penicillins  ---------------------------------------------------------------------------------------------------------------------   Home medications:    Medications below are reported home medications pulling from within the system; at this time, these medications have not been reconciled unless otherwise specified and are in the verification process for further verifcation as current home medications.  Medications Prior to Admission   Medication Sig Dispense Refill Last Dose   • acetaminophen (TYLENOL) 650 MG 8 hr tablet Take 650 mg by mouth Every 8 (Eight) Hours As Needed.   Past Week at Unknown time   • amLODIPine (NORVASC) 10 MG tablet Take 10 mg by mouth Daily.   3/17/2022 at 0800   • aspirin 81 MG EC tablet Take 81 mg by mouth Every Other Day.   3/17/2022 at 0800   • atorvastatin (LIPITOR) 40 MG tablet Take 40 mg by mouth Daily.   3/17/2022 at 0800   • cholecalciferol (VITAMIN D3) 25 MCG (1000 UT) tablet Take 1,000 Units by mouth Daily.   3/17/2022 at 0800   • gabapentin (NEURONTIN) 300 MG capsule Take 300 mg by mouth Every Night.   3/17/2022 at 1900   • hydroCHLOROthiazide (HYDRODIURIL) 12.5 MG tablet Take 12.5 mg by mouth Daily.   3/17/2022 at 0800   • losartan (COZAAR) 50 MG tablet Take 50 mg by mouth Daily.   3/17/2022 at 0800   • metFORMIN (GLUCOPHAGE) 1000 MG tablet Take 1,000 mg by mouth 2 (Two) Times a Day With Meals.   3/17/2022 at 1900   • metoprolol succinate XL (TOPROL-XL) 50 MG 24 hr tablet Take 50 mg by mouth Daily.   3/17/2022 at 0800   • montelukast (SINGULAIR) 10 MG tablet Take 10 mg by mouth Every Night.   3/17/2022 at 1900   • pantoprazole (PROTONIX) 40 MG EC tablet Take 40 mg by mouth  Daily.   3/17/2022 at 0800       [facility] Scheduled Meds:  amLODIPine, 10 mg, Oral, Daily  [START ON 3/19/2022] aspirin, 81 mg, Oral, Every Other Day  atorvastatin, 40 mg, Oral, Daily  cholecalciferol, 1,000 Units, Oral, Daily  gabapentin, 300 mg, Oral, Nightly  heparin (porcine), 5,000 Units, Subcutaneous, Q12H  insulin aspart, 0-7 Units, Subcutaneous, TID AC  ipratropium-albuterol, 3 mL, Nebulization, 4x Daily - RT  losartan, 50 mg, Oral, Daily  metoprolol succinate XL, 50 mg, Oral, Daily  montelukast, 10 mg, Oral, Nightly  oseltamivir, 75 mg, Oral, Q12H  pantoprazole, 40 mg, Oral, Daily  sodium chloride, 10 mL, Intravenous, Q12H      sodium chloride, 125 mL/hr        Current listed hospital scheduled medications may not yet reflect those currently placed in orders that are signed and held awaiting patient's arrival to floor.   ---------------------------------------------------------------------------------------------------------------------     Objective     Vital Signs:  Temp:  [99 °F (37.2 °C)-100.2 °F (37.9 °C)] 99 °F (37.2 °C)  Heart Rate:  [] 101  Resp:  [20-22] 20  BP: (106-154)/(48-83) 154/78      03/18/22  0128   Weight: 106 kg (234 lb)     Body mass index is 36.65 kg/m².  ---------------------------------------------------------------------------------------------------------------------       Physical Exam  Vitals and nursing note reviewed.   Constitutional:       General: He is awake.      Appearance: Normal appearance. He is well-developed.   HENT:      Head: Atraumatic.   Eyes:      General: Lids are normal.      Conjunctiva/sclera:      Right eye: Right conjunctiva is not injected.      Left eye: Left conjunctiva is not injected.   Cardiovascular:      Rate and Rhythm: Normal rate and regular rhythm.      Heart sounds: S1 normal and S2 normal.   Pulmonary:      Effort: No tachypnea or bradypnea.      Breath sounds: Examination of the right-lower field reveals decreased breath sounds.  Examination of the left-lower field reveals decreased breath sounds. Decreased breath sounds present. No wheezing, rhonchi or rales.   Abdominal:      General: Bowel sounds are normal.      Palpations: Abdomen is soft.      Tenderness: There is no abdominal tenderness.   Musculoskeletal:      Cervical back: Full passive range of motion without pain. No edema or erythema.      Right lower leg: No swelling. No edema.      Left lower leg: No swelling. No edema.   Skin:     General: Skin is warm and moist.   Neurological:      Mental Status: He is alert and oriented to person, place, and time.   Psychiatric:         Attention and Perception: Attention normal.         Mood and Affect: Mood normal.         Behavior: Behavior is cooperative.           ---------------------------------------------------------------------------------------------------------------------  EKG:                  ---------------------------------------------------------------------------------------------------------------------   Results from last 7 days   Lab Units 03/18/22  0459 03/18/22  0155   CRP mg/dL  --  8.88*   LACTATE mmol/L 2.8* 2.3*   WBC 10*3/mm3  --  8.57   HEMOGLOBIN g/dL  --  14.2   HEMATOCRIT %  --  42.7   MCV fL  --  88.8   MCHC g/dL  --  33.3   PLATELETS 10*3/mm3  --  140     Results from last 7 days   Lab Units 03/18/22  0324   PH, ARTERIAL pH units 7.428   PO2 ART mm Hg 61.8*   PCO2, ARTERIAL mm Hg 41.5   HCO3 ART mmol/L 27.4*     Results from last 7 days   Lab Units 03/18/22 0155   SODIUM mmol/L 138   POTASSIUM mmol/L 3.7   CHLORIDE mmol/L 98   CO2 mmol/L 26.5   BUN mg/dL 12   CREATININE mg/dL 1.01   CALCIUM mg/dL 9.1   GLUCOSE mg/dL 221*   ALBUMIN g/dL 4.30   BILIRUBIN mg/dL 0.6   ALK PHOS U/L 79   AST (SGOT) U/L 17   ALT (SGPT) U/L 22   Estimated Creatinine Clearance: 76.8 mL/min (by C-G formula based on SCr of 1.01 mg/dL).  No results found for: AMMONIA  Results from last 7 days   Lab Units 03/18/22 0155   TROPONIN T  ng/mL <0.010                 No results found for: HGBA1C  No results found for: TSH, FREET4  No results found for: PREGTESTUR, PREGSERUM, HCG, HCGQUANT  Pain Management Panel    There is no flowsheet data to display.       No results found for: BLOODCX  No results found for: URINECX  No results found for: WOUNDCX  No results found for: STOOLCX      ---------------------------------------------------------------------------------------------------------------------  Imaging Results (Last 7 Days)     Procedure Component Value Units Date/Time    XR Chest 1 View [[id number]] Collected: 03/18/22 0410     Updated: 03/18/22 0412    Narrative:      CR Chest 1 Vw    INDICATION:   Cough and shortness of air today     COMPARISON:    None available.    FINDINGS:  Portable AP view(s) of the chest.  The heart and mediastinal contours are normal. The lungs are clear. No pneumothorax or pleural effusion.      Impression:      No acute cardiopulmonary findings.    Signer Name: Moses Crowder MD   Signed: 3/18/2022 4:10 AM   Workstation Name: [facility]    [facility]          Cultures:  No results found for: BLOODCX, URINECX, WOUNDCX, MRSACX, RESPCX, STOOLCX    I have personally reviewed the above radiology images and read the final radiology report on 03/18/22  ---------------------------------------------------------------------------------------------------------------------  Assessment / Plan     Active Hospital Problems    Diagnosis  POA   • Severe sepsis (HCC) [A41.9, R65.20]  Yes       ASSESSMENT/PLAN:    -Severe sepsis, present on admission, HR>90, RR>20, and influenza A with lactate>2:   -Immunosuppressed state with known CLL (has not been on chemotherapy for 2 years):   -Hx OC43 Coronavirus infection in January 2022 requiring hospitalization:   • Tamiflu added.   • MRSA swab nares added given influenza A with increased risk for MRSA PNA.   • CT chest without contrast pending.   • Procal negative.  "  • PRN antitussives on board with tessalon perles and robitussin.    • Scheduled inhalants on board.   • Peripheral blood cultures performed and pending.   • Antipyretics on board.   • Check UA.     -Reported Acute hypoxemic respiratory failure with reported drops in the mid 80s on room air in the ED:  -Mild persistent asthma:   -Documented prior history of calcified and noncalcified lung nodules:   -Severe KAILA on CPAP (sleep study in 06/2014):   · Treat flu A as outlined.   · Orders placed to titrate oxygen saturation greater than or equal to 90%.    · PFT in 12/2019 reportedly \"normal\" per review of outside record charting with PFT itself not available for viewing.   · Place order for CPAP use.   · Continuous cardiac monitoring.   · ABG reviewed with borderline paO2.      -Decreased oral intake/appetitie change:   · Hold home HCTZ.   · Sepsis bolus on board.   · PRN Zofran on board (QTc WNL).     -CLL diagnosed in 2008:   · Continue outpatient follow-up in [place].   · No longer on oral chemotherapy.     -Hyperglycemia with DM type 2:   • Given diabetes mellitus, fingerstick blood glucose monitoring has been ordered AC&HS.   • Sliding scale insulin has been ordered PRN.    • Hemoglobin a1c on March 01, 2022 noted to be 8.2 per PCP.   • Hypoglycemia protocol on board if needed.      -Essential HTN:   · Vitals per hospital protocol.   · Continue home Norvasc 10mg & home Losartan 50mg qd.   · Continue home Metoprolol XL 50mg daily.   · Hold home HCTZ given decreased oral intake.      -CAD,  Chest pain free at the time of evaluation:   -Hyperlipidemia:   · Negative stress test in 07/20/2021 @ OSH.    · Outpatient Cardiology visit note from 03/02/22 reviewed with lipid panel reviewed and 6 months ollow-up recommended.   · Continue outpatient statin.   · EKG without known acute ischemia.          ----------  -DVT prophylaxis: SQ Heparin  -Activity: Up with assist  -Expected length of stay: INPATIENT status due to the need " for care which can only be reasonably provided in an hospital setting such as aggressive/expedited ancillary services and/or consultation services, the necessity for IV medications, close physician monitoring and/or the possible need for procedures.  In such, I feel patient’s risk for adverse outcomes and need for care warrant INPATIENT evaluation and predict the patient’s care encounter to likely last beyond 2 midnights.  -Disposition: Plans to return home with wife at discharge    High risk secondary to severe sepsis criteria met on admission 2/2 influenza A, hx CLL, decreased appetite and dehydration, Hyperglycemia, Decreased oral intake        Mackenzie Delacruz PA-C  03/18/22  16:37 EDT  Pager # [phone]  ---------------------------------------------------------------------------------------------------------------------             Electronically signed by Stefano Garner DO at 03/18/22 1809          Emergency Department Notes      Hugh Quarles PCT at 03/18/22 0220        EKG completed by tech at 0206. Handed to Dr. Chambers at 0214    Electronically signed by Hugh Quarles PCT at 03/18/22 0220     Sergei Ballard PA-C at 03/18/22 3274          Subjective   Patient is a 71-year-old male with past medical history significant for diabetes mellitus, hypertension, hyperlipidemia, GERD.  He presents to the ED today with complaints of shortness of breath and cough.  He reports that he has had fatigue, body aches and fever that has been ongoing for the last 2 to 3 days.  Patient reports that he is currently traveling and staying here for revival at a [employer], he resides in [place]. He reports a few months ago he was diagnosed with Covid pneumonia and required hospitalization and required oxygen.  Patient reports that after being discharged from the hospital he was back to his baseline and has not required any oxygen since then.  Patient denies any chest pain, vomiting, diarrhea, abdominal pain,  dysuria, flank pain, dizziness, headache, syncope/near syncope, focal weakness, back pain, neck pain, numbness/tingling or any other significant complaints.           Review of Systems   Constitutional: Positive for appetite change, chills, fatigue and fever.   HENT: Positive for congestion.    Eyes: Negative.    Respiratory: Positive for cough and shortness of breath.    Cardiovascular: Negative.  Negative for chest pain.   Gastrointestinal: Negative.  Negative for abdominal pain.   Endocrine: Negative.    Genitourinary: Negative.  Negative for dysuria.   Musculoskeletal: Positive for myalgias.   Skin: Negative.    Allergic/Immunologic: Negative.    Neurological: Negative.    Hematological: Negative.    Psychiatric/Behavioral: Negative.    All other systems reviewed and are negative.      Past Medical History:   Diagnosis Date   • Depression    • Diabetes mellitus (HCC)    • GERD (gastroesophageal reflux disease)    • Hyperlipidemia    • Hypertension        Allergies   Allergen Reactions   • Benadryl [Diphenhydramine] Hives   • Penicillins Hives       No past surgical history on file.    No family history on file.    Social History     Socioeconomic History   • Marital status:            Objective   Physical Exam  Vitals and nursing note reviewed.   Constitutional:       General: He is not in acute distress.     Appearance: Normal appearance. He is well-developed. He is not diaphoretic.   HENT:      Head: Normocephalic and atraumatic.      Right Ear: External ear normal.      Left Ear: External ear normal.      Nose: Congestion present.      Mouth/Throat:      Mouth: Mucous membranes are moist.      Pharynx: Oropharynx is clear.   Eyes:      Conjunctiva/sclera: Conjunctivae normal.      Pupils: Pupils are equal, round, and reactive to light.   Neck:      Vascular: No JVD.      Trachea: No tracheal deviation.   Cardiovascular:      Rate and Rhythm: Normal rate and regular rhythm.      Heart sounds: Normal  heart sounds. No murmur heard.  Pulmonary:      Effort: Pulmonary effort is normal. No respiratory distress.      Breath sounds: Decreased breath sounds present. No wheezing.   Abdominal:      General: Bowel sounds are normal.      Palpations: Abdomen is soft.      Tenderness: There is no abdominal tenderness.   Musculoskeletal:         General: No deformity. Normal range of motion.      Cervical back: Normal range of motion and neck supple.   Skin:     General: Skin is warm and dry.      Capillary Refill: Capillary refill takes less than 2 seconds.      Coloration: Skin is not pale.      Findings: No erythema or rash.   Neurological:      General: No focal deficit present.      Mental Status: He is alert and oriented to person, place, and time. Mental status is at baseline.      Cranial Nerves: No cranial nerve deficit.   Psychiatric:         Mood and Affect: Mood normal.         Behavior: Behavior normal.         Thought Content: Thought content normal.         Judgment: Judgment normal.         Procedures      Results for orders placed or performed during the hospital encounter of 03/18/22   COVID-19 and FLU A/B PCR - Swab, Nasopharynx    Specimen: Nasopharynx; Swab   Result Value Ref Range    COVID19 Not Detected Not Detected - Ref. Range    Influenza A PCR Detected (A) Not Detected    Influenza B PCR Not Detected Not Detected   Comprehensive Metabolic Panel    Specimen: Arm, Left; Blood   Result Value Ref Range    Glucose 221 (H) 65 - 99 mg/dL    BUN 12 8 - 23 mg/dL    Creatinine 1.01 0.76 - 1.27 mg/dL    Sodium 138 136 - 145 mmol/L    Potassium 3.7 3.5 - 5.2 mmol/L    Chloride 98 98 - 107 mmol/L    CO2 26.5 22.0 - 29.0 mmol/L    Calcium 9.1 8.6 - 10.5 mg/dL    Total Protein 7.0 6.0 - 8.5 g/dL    Albumin 4.30 3.50 - 5.20 g/dL    ALT (SGPT) 22 1 - 41 U/L    AST (SGOT) 17 1 - 40 U/L    Alkaline Phosphatase 79 39 - 117 U/L    Total Bilirubin 0.6 0.0 - 1.2 mg/dL    Globulin 2.7 gm/dL    A/G Ratio 1.6 g/dL     BUN/Creatinine Ratio 11.9 7.0 - 25.0    Anion Gap 13.5 5.0 - 15.0 mmol/L    eGFR 79.0 >60.0 mL/min/1.73   Lactic Acid, Plasma    Specimen: Arm, Left; Blood   Result Value Ref Range    Lactate 2.3 (C) 0.5 - 2.0 mmol/L   CBC Auto Differential    Specimen: Arm, Left; Blood   Result Value Ref Range    WBC 8.57 3.40 - 10.80 10*3/mm3    RBC 4.81 4.14 - 5.80 10*6/mm3    Hemoglobin 14.2 13.0 - 17.7 g/dL    Hematocrit 42.7 37.5 - 51.0 %    MCV 88.8 79.0 - 97.0 fL    MCH 29.5 26.6 - 33.0 pg    MCHC 33.3 31.5 - 35.7 g/dL    RDW 14.1 12.3 - 15.4 %    RDW-SD 45.2 37.0 - 54.0 fl    MPV 9.2 6.0 - 12.0 fL    Platelets 140 140 - 450 10*3/mm3    Neutrophil % 82.4 (H) 42.7 - 76.0 %    Lymphocyte % 5.1 (L) 19.6 - 45.3 %    Monocyte % 11.2 5.0 - 12.0 %    Eosinophil % 0.2 (L) 0.3 - 6.2 %    Basophil % 0.6 0.0 - 1.5 %    Immature Grans % 0.5 0.0 - 0.5 %    Neutrophils, Absolute 7.06 (H) 1.70 - 7.00 10*3/mm3    Lymphocytes, Absolute 0.44 (L) 0.70 - 3.10 10*3/mm3    Monocytes, Absolute 0.96 (H) 0.10 - 0.90 10*3/mm3    Eosinophils, Absolute 0.02 0.00 - 0.40 10*3/mm3    Basophils, Absolute 0.05 0.00 - 0.20 10*3/mm3    Immature Grans, Absolute 0.04 0.00 - 0.05 10*3/mm3    nRBC 0.0 0.0 - 0.2 /100 WBC   STAT Lactic Acid, Reflex    Specimen: Arm, Left; Blood   Result Value Ref Range    Lactate 2.8 (C) 0.5 - 2.0 mmol/L   Procalcitonin    Specimen: Arm, Left; Blood   Result Value Ref Range    Procalcitonin 0.11 0.00 - 0.25 ng/mL   C-reactive Protein    Specimen: Arm, Left; Blood   Result Value Ref Range    C-Reactive Protein 8.88 (H) 0.00 - 0.50 mg/dL   Blood Gas, Arterial With Co-Ox    Specimen: Arterial Blood   Result Value Ref Range    Site Left Brachial     Chet's Test N/A     pH, Arterial 7.428 7.350 - 7.450 pH units    pCO2, Arterial 41.5 35.0 - 45.0 mm Hg    pO2, Arterial 61.8 (L) 83.0 - 108.0 mm Hg    HCO3, Arterial 27.4 (H) 20.0 - 26.0 mmol/L    Base Excess, Arterial 2.7 (H) 0.0 - 2.0 mmol/L    O2 Saturation, Arterial 92.7 (L) 94.0 -  99.0 %    Hemoglobin, Blood Gas 14.1 14 - 18 g/dL    Hematocrit, Blood Gas 43.2 38.0 - 51.0 %    Oxyhemoglobin 91.4 (L) 94 - 99 %    Methemoglobin 0.20 0.00 - 3.00 %    Carboxyhemoglobin 1.2 0 - 5 %    A-a Gradiant 33.8 0.0 - 300.0 mmHg    CO2 Content 28.7 22 - 33 mmol/L    Temperature 0.0 C    Barometric Pressure for Blood Gas 725 mmHg    Modality Room Air     FIO2 21 %    Ventilator Mode NA     Note      Collected by [id number]     pH, Temp Corrected      pCO2, Temperature Corrected      pO2, Temperature Corrected     Troponin    Specimen: Arm, Left; Blood   Result Value Ref Range    Troponin T <0.010 0.000 - 0.030 ng/mL   D-dimer, Quantitative    Specimen: Arm, Left; Blood   Result Value Ref Range    D-Dimer, Quantitative <0.27 0.00 - 0.50 MCGFEU/mL   Green Top (Gel)   Result Value Ref Range    Extra Tube Hold for add-ons.    Lavender Top   Result Value Ref Range    Extra Tube hold for add-on    Gold Top - SST   Result Value Ref Range    Extra Tube Hold for add-ons.    Light Blue Top   Result Value Ref Range    Extra Tube hold for add-on        ED Course  ED Course as of 03/18/22 1522   Fri Mar 18, 2022   0216 EKG 0206 a.m., sinus tachycardia 125 bpm, , QRS 92, QTc 433, left axis deviation.  No significant ST deviation T wave abnormalities concerning for acute ischemia.  Mildly delayed R wave progression. [KP]   0330 Patient spo2 noted to be 90-92% on room air. He reports feeling short of breath. His ABG reveals a Po2 of 61.8. Will place him on 2 L of oxygen. [MB]   0414 XR Chest 1 View  IMPRESSION:  No acute cardiopulmonary findings. [MB]   0734 Patient feeling better. He is satting 99% on 2 L. He reports cough and requesting tessalon perles. He denies any other needs. His vitals are stable. Wife at bedside. Waiting for admission to the hospital as there are no beds at this time.  [MB]   8188 Care endorsed to JAMAL CHO [MB]   2211 Call placed hospitalist possible admit. [BH]   9351 Discussed care with   Patsy. Patient will be admitted.  []      ED Course User Index  [] Sergei Ballard PA-C  [] Cesar Chambers MD  [MB] Sarah Brown APRN                                                 MDM    Final diagnoses:   Acute respiratory failure with hypoxia (HCC)   Influenza A       ED Disposition  ED Disposition     ED Disposition   Decision to Admit    Condition   --    Comment   --             No follow-up provider specified.       Medication List      ASK your doctor about these medications    metFORMIN 1000 MG tablet  Commonly known as: GLUCOPHAGE  Ask about: Which instructions should I use?             Sergei Ballard PA-C  03/18/22 1522      Electronically signed by Sergei Ballard PA-C at 03/18/22 1522     Supriya Razo, RN at 03/18/22 1106        Patient took home dose of morning medications per MARQUIS Lino's instructions. Wife provided medications.     Electronically signed by Supriya Razo, RN at 03/18/22 1107         Current Facility-Administered Medications   Medication Dose Route Frequency Provider Last Rate Last Admin   • acetaminophen (TYLENOL) tablet 650 mg  650 mg Oral Q6H PRN Mackenzie Delacruz PA-C       • amLODIPine (NORVASC) tablet 10 mg  10 mg Oral Daily Mackenzie Delcaruz PA-C       • aspirin EC tablet 81 mg  81 mg Oral Every Other Day Mackenzie Delacruz PA-C       • atorvastatin (LIPITOR) tablet 40 mg  40 mg Oral Daily Mackenzie Delacruz PA-C       • benzonatate (TESSALON) capsule 200 mg  200 mg Oral TID PRN Mackenzie Delacruz PA-C   200 mg at 03/19/22 0048   • cholecalciferol (VITAMIN D3) tablet 1,000 Units  1,000 Units Oral Daily Mackenzie Delacruz PA-C   1,000 Units at 03/18/22 1831   • dextrose (D50W) (25 g/50 mL) IV injection 25 g  25 g Intravenous Q15 Min PRN Mackenzie Delacruz PA-C       • dextrose (GLUTOSE) oral gel 15 g  15 g Oral Q15 Min PRN Mackenzie Delacruz PA-C       • gabapentin (NEURONTIN) capsule 300 mg  300 mg Oral  Nightly Stefano Garner DO   300 mg at 03/18/22 2041   • glucagon (human recombinant) (GLUCAGEN DIAGNOSTIC) injection 1 mg  1 mg Intramuscular Q15 Min PRN Mackenzie Delacruz PA-C       • guaiFENesin-dextromethorphan (ROBITUSSIN DM) 100-10 MG/5ML syrup 5 mL  5 mL Oral Q4H PRN Mackenzie Delacruz PA-C       • heparin (porcine) 5000 UNIT/ML injection 5,000 Units  5,000 Units Subcutaneous Q12H Mackenzie Delacruz PA-C   5,000 Units at 03/18/22 2041   • insulin aspart (novoLOG) injection 0-7 Units  0-7 Units Subcutaneous TID AC Mackenzie Delacruz PA-C   4 Units at 03/18/22 1729   • ipratropium-albuterol (DUO-NEB) nebulizer solution 3 mL  3 mL Nebulization 4x Daily - RT Mackenzie Delacruz PA-C   3 mL at 03/19/22 0728   • losartan (COZAAR) tablet 50 mg  50 mg Oral Daily Mackenzie Delacruz PA-C       • metoprolol succinate XL (TOPROL-XL) 24 hr tablet 50 mg  50 mg Oral Daily Mcakenzie Delacruz PA-C       • montelukast (SINGULAIR) tablet 10 mg  10 mg Oral Nightly Mackenzie Delacruz PA-C   10 mg at 03/18/22 2041   • nitroglycerin (NITROSTAT) SL tablet 0.4 mg  0.4 mg Sublingual Q5 Min PRN Mackenzie Delacruz PA-C       • ondansetron (ZOFRAN) injection 4 mg  4 mg Intravenous Q6H PRN Mackenzie Delacruz PA-C       • oseltamivir (TAMIFLU) capsule 75 mg  75 mg Oral Q12H Sarah Brown APRN   75 mg at 03/18/22 2041   • pantoprazole (PROTONIX) EC tablet 40 mg  40 mg Oral Daily Mackenzie Delacruz PA-C       • sodium chloride 0.9 % flush 1-10 mL  1-10 mL Intravenous PRN Mackenzie Delacruz PA-C       • sodium chloride 0.9 % flush 10 mL  10 mL Intravenous PRN Arnaud Smart MD       • sodium chloride 0.9 % flush 10 mL  10 mL Intravenous Q12H Mackenzie Delacruz PA-C   10 mL at 03/18/22 2042   • sodium chloride 0.9 % infusion  125 mL/hr Intravenous Continuous Mackenzie Delacruz PA-C 125 mL/hr at 03/19/22 0430 125 mL/hr at 03/19/22 0430   • sodium chloride 0.9% - IBW for BMI > 30  bolus 1,983 mL  30 mL/kg (Ideal) Intravenous PRN Stefano Garner DO         Orders (last 24 hrs)      Start     Ordered    03/20/22 0600  CBC & Differential  Morning Draw         03/19/22 0740    03/20/22 0600  Basic Metabolic Panel  Morning Draw         03/19/22 0740    03/19/22 0900  aspirin EC tablet 81 mg  Every Other Day         03/18/22 1602    03/19/22 0756  POC Glucose Once  PROCEDURE ONCE         03/19/22 0748    03/19/22 0600  Basic Metabolic Panel  Daily       03/18/22 1602    03/19/22 0600  CBC & Differential  Daily       03/18/22 1602    03/19/22 0600  CBC Auto Differential  PROCEDURE ONCE         03/18/22 2205    03/18/22 2100  sodium chloride 0.9 % flush 10 mL  Every 12 Hours Scheduled         03/18/22 1602    03/18/22 2100  heparin (porcine) 5000 UNIT/ML injection 5,000 Units  Every 12 Hours Scheduled         03/18/22 1602    03/18/22 2100  oseltamivir (TAMIFLU) capsule 75 mg  Every 12 Hours Scheduled,   Status:  Discontinued         03/18/22 1602    03/18/22 2100  montelukast (SINGULAIR) tablet 10 mg  Nightly         03/18/22 1602    03/18/22 2100  gabapentin (NEURONTIN) capsule 300 mg  Nightly         03/18/22 1631    03/18/22 2000  Vital Signs  Every 4 Hours      Comments: Per per hospital policy    03/18/22 1602    03/18/22 1948  POC Glucose Once  PROCEDURE ONCE         03/18/22 1941    03/18/22 1900  ipratropium-albuterol (DUO-NEB) nebulizer solution 3 mL  4 Times Daily - RT         03/18/22 1602    03/18/22 1800  atorvastatin (LIPITOR) tablet 40 mg  Daily         03/18/22 1602    03/18/22 1800  losartan (COZAAR) tablet 50 mg  Daily         03/18/22 1602    03/18/22 1800  pantoprazole (PROTONIX) EC tablet 40 mg  Daily         03/18/22 1602    03/18/22 1800  metoprolol succinate XL (TOPROL-XL) 24 hr tablet 50 mg  Daily         03/18/22 1602    03/18/22 1800  cholecalciferol (VITAMIN D3) tablet 1,000 Units  Daily         03/18/22 1602    03/18/22 1800  amLODIPine (NORVASC) tablet 10 mg   Daily         03/18/22 1602    03/18/22 1730  insulin aspart (novoLOG) injection 0-7 Units  3 Times Daily Before Meals         03/18/22 1602    03/18/22 1700  POC Glucose TID AC  3 Times Daily Before Meals       03/18/22 1602    03/18/22 1700  sodium chloride 0.9 % infusion  Continuous         03/18/22 1602    03/18/22 1645  POC Glucose Once  PROCEDURE ONCE         03/18/22 1638    03/18/22 1635  NIPPV (CPAP or BIPAP)  At Bedtime As Needed-RT        Comments: Respiratory to manage.    03/18/22 1634    03/18/22 1630  Urinalysis With Culture If Indicated - Urine, Clean Catch  Once         03/18/22 1630    03/18/22 1603  Notify Physician (with Parameters)  Until Discontinued         03/18/22 1602    03/18/22 1603  Intake & Output  Every Shift       03/18/22 1602    03/18/22 1603  Weigh patient  Once         03/18/22 1602    03/18/22 1603  Oxygen Therapy- Nasal Cannula; Titrate for SPO2: 90% - 95%  Continuous         03/18/22 1602    03/18/22 1603  Insert Peripheral IV  Once         03/18/22 1602    03/18/22 1603  Saline Lock & Maintain IV Access  Continuous,   Status:  Canceled         03/18/22 1602    03/18/22 1603  Telemetry - Maintain IV Access  Continuous         03/18/22 1602    03/18/22 1603  Continuous Cardiac Monitoring  Continuous         03/18/22 1602    03/18/22 1603  May Be Off Telemetry for Tests  Continuous         03/18/22 1602    03/18/22 1603  ACLS Protocol For Life Threatening Dysrhythmias (Unless Code Status Indicates Otherwise)  Continuous         03/18/22 1602    03/18/22 1603  Notify Provider if ACLS Protocol Activated  Until Discontinued         03/18/22 1602    03/18/22 1603  Pulse Oximetry, Continuous  Continuous         03/18/22 1602    03/18/22 1603  Pulse Oximetry,  Spot  Once         03/18/22 1602    03/18/22 1603  Fall Precautions  Continuous         03/18/22 1602    03/18/22 1603  MRSA Screen, PCR (Inpatient) - Swab, Nares  Once         03/18/22 1602    03/18/22 1603  Do NOT Hold Basal or  Correction Scale Insulin When Patient is NPO, Hold Scheduled Mealtime (Bolus) Insulin if NPO  Continuous         03/18/22 1602    03/18/22 1603  Follow [facility] Hypoglycemia Standing Orders For Blood Glucose Less Than 70 mg/dL  Until Discontinued        Comments: ALERT PATIENT - NOT NPO & CAN SAFELY SWALLOW  Administer 4 oz Fruit Juice OR 4 oz Regular Soda OR 8 oz Milk OR 15-30 grams (1 tube) of Glucose Gel.  Recheck Blood Glucose Approximately 15 Minutes After Ingestion, Repeat Treatment & Continue to Recheck Blood Sugar Approximately Every 15 Minutes Until Blood Glucose is 70 or Higher.  Once Blood Glucose is 70 or Higher & if It Will Be More Than 60 Minutes Until Next Meal, Provide Appropriate Snack (Including Carbohydrate Food) Based on Meal Plan Orde chidi. Give Meal Tray As Soon As Possible.    PATIENT HAS IV ACCESS - UNRESPONSIVE, NPO OR UNABLE TO SAFELY SWALLOW  Administer 25g (50ml) D50W IV Push.  Recheck Blood Glucose Approximately 15 Minutes After Administration, if Blood Glucose Remains Less Than 70, Repeat Treatment   Recheck Blood Glucose Approximately 15 Minutes After 2nd Administration, if Blood Glucose Remains Less Than 70 After 2nd Dose of D50W, Contact Provider for Further Treatment Orders & Consider Adding IVF With D5W for [facility]    PATIENT WITHOUT IV ACCESS - UNRESPONSIVE, NPO OR UNABLE TO SAFELY SWALLOW  Administer 1mg Glucagon SQ & Establish IV Access.  Turn Patient on Side - Nausea / Vomiting May Occur.  Recheck Blood Glucose Approximately 15 Minutes After Administration.  If Blood Glucose Remains Less Than 70, Administer 25g D50W IV Push (50ml).  Recheck Blood Glucose Approximately 15 Minutes After Administration of D50W, if Blood Glucose Remains Less Than 70, Contact Provider for Further Treatment Orders & C  Consider Adding IVF With D5 for Maintenance    Document Event & Patient Response to Interventions in EMR, Document Medications on MAR  Notify Provider if Hypoglycemia Treatment Needed     03/18/22 1602    03/18/22 1602  sodium chloride 0.9 % flush 1-10 mL  As Needed         03/18/22 1602    03/18/22 1602  Telemetry - Pulse Oximetry  Continuous PRN,   Status:  Canceled      Comments: If Patient Develops Unresponsiveness, Acute Dyspnea, Cyanosis or Suspected Hypoxemia Start Continuous Pulse Ox Monitoring, Apply Oxygen & Notify Provider    03/18/22 1602    03/18/22 1602  nitroglycerin (NITROSTAT) SL tablet 0.4 mg  Every 5 Minutes PRN         03/18/22 1602    03/18/22 1602  benzonatate (TESSALON) capsule 200 mg  3 Times Daily PRN         03/18/22 1602    03/18/22 1602  dextrose (GLUTOSE) oral gel 15 g  Every 15 Minutes PRN         03/18/22 1602    03/18/22 1602  dextrose (D50W) (25 g/50 mL) IV injection 25 g  Every 15 Minutes PRN         03/18/22 1602    03/18/22 1602  glucagon (human recombinant) (GLUCAGEN DIAGNOSTIC) injection 1 mg  Every 15 Minutes PRN         03/18/22 1602    03/18/22 1602  acetaminophen (TYLENOL) tablet 650 mg  Every 6 Hours PRN         03/18/22 1602    03/18/22 1602  ondansetron (ZOFRAN) injection 4 mg  Every 6 Hours PRN         03/18/22 1602    03/18/22 1602  guaiFENesin-dextromethorphan (ROBITUSSIN DM) 100-10 MG/5ML syrup 5 mL  Every 4 Hours PRN         03/18/22 1602    03/18/22 1536  Code Status and Medical Interventions:  Continuous         03/18/22 1541    03/18/22 1528  sodium chloride 0.9% - IBW for BMI > 30 bolus 1,983 mL  As Needed         03/18/22 1528    03/18/22 1523  Inpatient Admission  Once         03/18/22 1522    03/18/22 1522  Hospitalist (on-call MD unless specified)  Once        Specialty:  Hospitalist  Provider:  Stefano Garner DO    03/18/22 1522    03/18/22 1521  CT Chest Without Contrast Diagnostic  1 Time Imaging         03/18/22 1520    03/18/22 0725  benzonatate (TESSALON) capsule 100 mg  3 Times Daily PRN,   Status:  Discontinued         03/18/22 0725    03/18/22 0424  oseltamivir (TAMIFLU) capsule 75 mg  Every 12 Hours Scheduled          03/18/22 0419    03/18/22 0130  sodium chloride 0.9 % flush 10 mL  As Needed         03/18/22 0131    02/25/22 0000  pantoprazole (PROTONIX) 40 MG EC tablet  Daily         03/18/22 0603    01/17/22 0000  amLODIPine (NORVASC) 10 MG tablet  Daily         03/18/22 0603    01/17/22 0000  metoprolol succinate XL (TOPROL-XL) 50 MG 24 hr tablet  Daily         03/18/22 0603    07/15/21 0000  gabapentin (NEURONTIN) 300 MG capsule  Nightly         03/18/22 0603    Unscheduled  Oxygen Therapy- Nasal Cannula; Titrate for SPO2: 90% - 95%  Continuous PRN      Comments: If Patient Develops Unresponsiveness, Acute Dyspnea, Cyanosis or Suspected Hypoxemia Start Continuous Pulse Ox Monitoring, Apply Oxygen & Notify Provider    03/18/22 1602    Unscheduled  ECG 12 Lead  As Needed      Comments: Nurse to Release if Patient Expericences Acute Chest Pain or Dysrhythmias    03/18/22 1602    Unscheduled  Potassium  As Needed      Comments: For Ventricular Arrhythmias      03/18/22 1602    Unscheduled  Magnesium  As Needed      Comments: For Ventricular Arrhythmias      03/18/22 1602    Unscheduled  Troponin  As Needed      Comments: For Chest Pain      03/18/22 1602    Unscheduled  Blood Gas, Arterial -With Co-Ox Panel: Yes  As Needed      Comments: Per O2 PolicyNotify Physician      03/18/22 1602    Unscheduled  Up With Assistance  As Needed       03/18/22 1602    Unscheduled  Check Pulse Oximetry On Room Air While Resting  As Needed       03/19/22 0739    Unscheduled  Check Pulse Oximetry While Ambulating  As Needed       03/19/22 0740    --  aspirin 81 MG EC tablet  Every Other Day         03/18/22 0603    --  atorvastatin (LIPITOR) 40 MG tablet  Daily         03/18/22 0603    --  acetaminophen (TYLENOL) 650 MG 8 hr tablet  Every 8 Hours PRN         03/18/22 0603    --  metFORMIN (GLUCOPHAGE) 1000 MG tablet  2 Times Daily With Meals         03/18/22 0711    --  hydroCHLOROthiazide (HYDRODIURIL) 12.5 MG tablet  Daily         03/18/22  0711    --  losartan (COZAAR) 50 MG tablet  Daily         03/18/22 0711    --  cholecalciferol (VITAMIN D3) 25 MCG (1000 UT) tablet  Daily         03/18/22 0711    --  montelukast (SINGULAIR) 10 MG tablet  Nightly         03/18/22 0711                Physician Progress Notes (last 24 hours)  Notes from 03/18/22 0829 through 03/19/22 0829   No notes of this type exist for this encounter.         Consult Notes (last 24 hours)  Notes from 03/18/22 0829 through 03/19/22 0829   No notes of this type exist for this encounter.

## 2022-03-19 NOTE — PLAN OF CARE
Goal Outcome Evaluation:  Plan of Care Reviewed With: patient        Progress: no change  Outcome Evaluation: patient ambulating in room, tolerating well. O2 stable on 2L NC. denies pain or discomfort

## 2022-03-19 NOTE — PLAN OF CARE
Goal Outcome Evaluation:  Plan of Care Reviewed With: patient        Progress: improving  Outcome Evaluation: Ambulating in room.  2 liters nasal cannula.  Denies pain.  Continue to monitor.

## 2022-03-20 VITALS
TEMPERATURE: 98 F | HEIGHT: 67 IN | WEIGHT: 229.44 LBS | HEART RATE: 90 BPM | DIASTOLIC BLOOD PRESSURE: 66 MMHG | BODY MASS INDEX: 36.01 KG/M2 | RESPIRATION RATE: 20 BRPM | OXYGEN SATURATION: 96 % | SYSTOLIC BLOOD PRESSURE: 132 MMHG

## 2022-03-20 LAB
ANION GAP SERPL CALCULATED.3IONS-SCNC: 12.5 MMOL/L (ref 5–15)
BASOPHILS # BLD AUTO: 0.02 10*3/MM3 (ref 0–0.2)
BASOPHILS NFR BLD AUTO: 0.4 % (ref 0–1.5)
BUN SERPL-MCNC: 12 MG/DL (ref 8–23)
BUN/CREAT SERPL: 17.6 (ref 7–25)
CALCIUM SPEC-SCNC: 8.3 MG/DL (ref 8.6–10.5)
CHLORIDE SERPL-SCNC: 105 MMOL/L (ref 98–107)
CO2 SERPL-SCNC: 23.5 MMOL/L (ref 22–29)
CREAT SERPL-MCNC: 0.68 MG/DL (ref 0.76–1.27)
DEPRECATED RDW RBC AUTO: 47.5 FL (ref 37–54)
EGFRCR SERPLBLD CKD-EPI 2021: 98.8 ML/MIN/1.73
EOSINOPHIL # BLD AUTO: 0.13 10*3/MM3 (ref 0–0.4)
EOSINOPHIL NFR BLD AUTO: 2.9 % (ref 0.3–6.2)
ERYTHROCYTE [DISTWIDTH] IN BLOOD BY AUTOMATED COUNT: 14.2 % (ref 12.3–15.4)
GLUCOSE BLDC GLUCOMTR-MCNC: 116 MG/DL (ref 70–130)
GLUCOSE SERPL-MCNC: 137 MG/DL (ref 65–99)
HCT VFR BLD AUTO: 39.1 % (ref 37.5–51)
HGB BLD-MCNC: 12.8 G/DL (ref 13–17.7)
IMM GRANULOCYTES # BLD AUTO: 0.02 10*3/MM3 (ref 0–0.05)
IMM GRANULOCYTES NFR BLD AUTO: 0.4 % (ref 0–0.5)
LYMPHOCYTES # BLD AUTO: 0.96 10*3/MM3 (ref 0.7–3.1)
LYMPHOCYTES NFR BLD AUTO: 21.4 % (ref 19.6–45.3)
MCH RBC QN AUTO: 29.8 PG (ref 26.6–33)
MCHC RBC AUTO-ENTMCNC: 32.7 G/DL (ref 31.5–35.7)
MCV RBC AUTO: 90.9 FL (ref 79–97)
MONOCYTES # BLD AUTO: 0.49 10*3/MM3 (ref 0.1–0.9)
MONOCYTES NFR BLD AUTO: 10.9 % (ref 5–12)
NEUTROPHILS NFR BLD AUTO: 2.87 10*3/MM3 (ref 1.7–7)
NEUTROPHILS NFR BLD AUTO: 64 % (ref 42.7–76)
NRBC BLD AUTO-RTO: 0 /100 WBC (ref 0–0.2)
PLATELET # BLD AUTO: 140 10*3/MM3 (ref 140–450)
PMV BLD AUTO: 9.6 FL (ref 6–12)
POTASSIUM SERPL-SCNC: 3.7 MMOL/L (ref 3.5–5.2)
RBC # BLD AUTO: 4.3 10*6/MM3 (ref 4.14–5.8)
SODIUM SERPL-SCNC: 141 MMOL/L (ref 136–145)
WBC NRBC COR # BLD: 4.49 10*3/MM3 (ref 3.4–10.8)

## 2022-03-20 PROCEDURE — 94799 UNLISTED PULMONARY SVC/PX: CPT

## 2022-03-20 PROCEDURE — 82962 GLUCOSE BLOOD TEST: CPT

## 2022-03-20 PROCEDURE — 99239 HOSP IP/OBS DSCHRG MGMT >30: CPT | Performed by: PHYSICIAN ASSISTANT

## 2022-03-20 PROCEDURE — 80048 BASIC METABOLIC PNL TOTAL CA: CPT | Performed by: PHYSICIAN ASSISTANT

## 2022-03-20 PROCEDURE — 25010000002 HEPARIN (PORCINE) PER 1000 UNITS: Performed by: PHYSICIAN ASSISTANT

## 2022-03-20 PROCEDURE — 94660 CPAP INITIATION&MGMT: CPT

## 2022-03-20 PROCEDURE — 85025 COMPLETE CBC W/AUTO DIFF WBC: CPT | Performed by: PHYSICIAN ASSISTANT

## 2022-03-20 RX ORDER — BENZONATATE 200 MG/1
200 CAPSULE ORAL 3 TIMES DAILY PRN
Qty: 21 CAPSULE | Refills: 0 | Status: SHIPPED | OUTPATIENT
Start: 2022-03-20 | End: 2022-03-27

## 2022-03-20 RX ORDER — OSELTAMIVIR PHOSPHATE 75 MG/1
75 CAPSULE ORAL EVERY 12 HOURS SCHEDULED
Qty: 6 CAPSULE | Refills: 0 | Status: SHIPPED | OUTPATIENT
Start: 2022-03-20 | End: 2022-03-23

## 2022-03-20 RX ADMIN — IPRATROPIUM BROMIDE AND ALBUTEROL SULFATE 3 ML: .5; 3 SOLUTION RESPIRATORY (INHALATION) at 00:38

## 2022-03-20 RX ADMIN — HEPARIN SODIUM 5000 UNITS: 5000 INJECTION INTRAVENOUS; SUBCUTANEOUS at 08:55

## 2022-03-20 RX ADMIN — METOPROLOL SUCCINATE 50 MG: 50 TABLET, EXTENDED RELEASE ORAL at 08:50

## 2022-03-20 RX ADMIN — LOSARTAN POTASSIUM 50 MG: 50 TABLET, FILM COATED ORAL at 08:48

## 2022-03-20 RX ADMIN — Medication 10 ML: at 08:54

## 2022-03-20 RX ADMIN — METFORMIN HYDROCHLORIDE 1000 MG: 500 TABLET ORAL at 08:49

## 2022-03-20 RX ADMIN — CHOLECALCIFEROL TAB 10 MCG (400 UNIT) 1000 UNITS: 10 TAB at 08:52

## 2022-03-20 RX ADMIN — OSELTAMIVIR PHOSPHATE 75 MG: 75 CAPSULE ORAL at 08:52

## 2022-03-20 RX ADMIN — PANTOPRAZOLE SODIUM 40 MG: 40 TABLET, DELAYED RELEASE ORAL at 08:52

## 2022-03-20 RX ADMIN — AMLODIPINE BESYLATE 10 MG: 10 TABLET ORAL at 08:49

## 2022-03-20 RX ADMIN — ATORVASTATIN CALCIUM 40 MG: 40 TABLET, FILM COATED ORAL at 08:52

## 2022-03-20 RX ADMIN — IPRATROPIUM BROMIDE AND ALBUTEROL SULFATE 3 ML: .5; 3 SOLUTION RESPIRATORY (INHALATION) at 06:38

## 2022-03-20 NOTE — PLAN OF CARE
Goal Outcome Evaluation:  Plan of Care Reviewed With: patient        Progress: no change  Outcome Evaluation: ambulating in room. O2 stable on RA. denies pain or discomfort.

## 2022-03-20 NOTE — DISCHARGE SUMMARY
[facility] Medicine Services  DISCHARGE SUMMARY    Date of Admission: 3/18/2022    Date of Discharge:  3/20/2022    PCP: Provider, No Known    Discharging Provider: Mackenzie Delacruz PA-C  Attending Physician on day of DC: Dr. Stefano Garner    Admission Diagnosis:   Please see admission H&P    Discharge Diagnosis:   · Severe sepsis 2/2 Influenza A  · Influenza A  · Reported acute hypoxemic respiratory failure  · Severe KAILA on CPAP  · Immunosuppressed state with known CLL  · Decreased oral intake, improved  · Hyperglycemia with known DM2, NID  · CAD, chest pain free during hospitalization  · Hyperlipidemia  · Multiple hepatic cysts found incidentally on CT chest        HPI     History of Present Illness:  Bro Prince is a 71 y.o. male with past medical history significant for diabetes mellitus type 2, depression, GERD, hyperlipidemia, hypertension.  He was hospitalized in January 2022 @ [facility] in [place] with bilateral pneumonia and upper respiratory panel positive for coronavirus OC 43 in addition to charted severe obstructive sleep apnea on CPAP.  He presented to the ED of [facility] after midnight on 03/18 with flu like symptoms.  He was diagnosed with influenza A and experienced reported hypoxia in the ED with reports of drops into the 80s. He was admitted for further evaluation and treatment.     Please see admission H&P for further details.        Hospital Course     Hospital Course  Bro Prince was admitted as outlined in above HPI.     Mr. Prince was started on Tamiflu in addition to inhalant regimen and antitussives.  Supplemental oxygen was utilized with goal to titrate O2 >90%. CT chest was obtained and was unremarkable.  MRSA swab of nares was performed as well and was found to be negative.     Mr. Prince progressed well during his hospitalization. He was titrated down to room air and oxygenated well at rest and when ambulating to the restroom. He reported  significant improvement overall with breathing, body aches, and generalized weakness. He was gently hydrated while here with his HCTZ held on admission given poor oral intake and bodyaches with concern for possibly underlying dehydration.  HCTZ remained held at discharge to be restarted at the discretion of his PCP upon follow-up.      On day of discharge, Mr. Prince was ambulating independently well and breathing well on room air with adequate oral intake and reported significant improvement.     CT chest without contrast was performed on admission without known acute infiltrates but with evidence of multiple hepatic cysts.  US was mentioned for further additional information in radiology note. Will leave US of liver to the discretion of PCP. Patient without abdominal pain while hospitalized and with LFTs within normal limits.     On the day of discharge, Mr. Prince expressed he planned to return immediately home to [place]. He reported he had inhalers available to him and would not need these on discharge.  We discussed competing course of tamiflu and following up with his PCP upon discharge.        Pertinent Laboratory and Radiology Results     Pertinent Test Results:      Results from last 7 days   Lab Units 03/18/22  0324   PH, ARTERIAL pH units 7.428   PO2 ART mm Hg 61.8*   PCO2, ARTERIAL mm Hg 41.5   HCO3 ART mmol/L 27.4*     Results from last 7 days   Lab Units 03/20/22  0255 03/19/22  1047 03/19/22  0751 03/18/22  0155   WBC 10*3/mm3 4.49 5.94  --  8.57   HEMOGLOBIN g/dL 12.8* 12.7*  --  14.2   HEMATOCRIT % 39.1 38.8  --  42.7   PLATELETS 10*3/mm3 140 135*  --  140   MCV fL 90.9 90.2  --  88.8   SODIUM mmol/L 141  --  141 138   POTASSIUM mmol/L 3.7  --  3.6 3.7   CHLORIDE mmol/L 105  --  103 98   CO2 mmol/L 23.5  --  27.8 26.5   BUN mg/dL 12  --  16 12   CREATININE mg/dL 0.68*  --  0.85 1.01   GLUCOSE mg/dL 137*  --  174* 221*   CALCIUM mg/dL 8.3*  --  8.9 9.1        Results from last 7 days   Lab Units  03/18/22  0155   TROPONIN T ng/mL <0.010     Results from last 7 days   Lab Units 03/20/22  0255 03/19/22  1047 03/18/22  0459 03/18/22  0155   CRP mg/dL  --  8.00*  --  8.88*   LACTATE mmol/L  --   --  2.8* 2.3*   WBC 10*3/mm3 4.49 5.94  --  8.57     Results from last 7 days   Lab Units 03/18/22  0155   BILIRUBIN mg/dL 0.6   ALK PHOS U/L 79   ALT (SGPT) U/L 22   AST (SGOT) U/L 17           Invalid input(s): TG, LDLCALC, LDLREALC      Brief Urine Lab Results  (Last result in the past 365 days)      Color   Clarity   Blood   Leuk Est   Nitrite   Protein   CREAT   Urine HCG        03/18/22 1706 Yellow   Clear   Negative   Negative   Negative   Negative                             Pending Labs     Order Current Status    Blood Culture - Blood, Arm, Left Preliminary result    Blood Culture - Blood, Arm, Right Preliminary result            ----------------------------------------------------------------------------------------------------------------------  CT Chest Without Contrast Diagnostic    Result Date: 3/18/2022  1.  No clearly acute infiltrates demonstrated. No pleural fluid. No pneumothorax. 2.  No threshold mediastinal or hilar adenopathy. 3.  There are what is likely multiple hepatic cysts in the left lobe of the liver. Clinical aspects of the case will determine if hepatic ultrasound could provide additional information. Signer Name: Bro Fontanez MD  Signed: 3/18/2022 8:23 PM  Workstation Name: [id number]  Radiology [facility]    XR Chest 1 View    Result Date: 3/18/2022  No acute cardiopulmonary findings. Signer Name: Moses Crowder MD  Signed: 3/18/2022 4:10 AM  Workstation Name: [email or web address]  [facility]        Microbiology Results (last 10 days)     Procedure Component Value - Date/Time    MRSA Screen, PCR (Inpatient) - Swab, Nares [[id number]]  (Normal) Collected: 03/19/22 0431    Lab Status: Final result Specimen: Swab from Nares Updated: 03/19/22 0543     MRSA PCR  Negative     Staph aureus by PCR Negative    COVID PRE-OP / PRE-PROCEDURE SCREENING ORDER (NO ISOLATION) - Swab, Nasopharynx [[id number]]  (Abnormal) Collected: 03/18/22 0158    Lab Status: Final result Specimen: Swab from Nasopharynx Updated: 03/18/22 0230    Narrative:      The following orders were created for panel order COVID PRE-OP / PRE-PROCEDURE SCREENING ORDER (NO ISOLATION) - Swab, Nasopharynx.  Procedure                               Abnormality         Status                     ---------                               -----------         ------                     COVID-19 and FLU A/B PCR...[[id number]]  Abnormal            Final result                 Please view results for these tests on the individual orders.    COVID-19 and FLU A/B PCR - Swab, Nasopharynx [[id number]]  (Abnormal) Collected: 03/18/22 0158    Lab Status: Final result Specimen: Swab from Nasopharynx Updated: 03/18/22 0230     COVID19 Not Detected     Influenza A PCR Detected     Influenza B PCR Not Detected    Narrative:      Fact sheet for providers: https://www.fda.gov/media/238698/download    Fact sheet for patients: https://www.fda.gov/media/509709/download    Test performed by PCR.    Blood Culture - Blood, Arm, Left [[id number]]  (Normal) Collected: 03/18/22 0155    Lab Status: Preliminary result Specimen: Blood from Arm, Left Updated: 03/20/22 0201     Blood Culture No growth at 2 days    Blood Culture - Blood, Arm, Right [[id number]]  (Normal) Collected: 03/18/22 0155    Lab Status: Preliminary result Specimen: Blood from Arm, Right Updated: 03/20/22 0201     Blood Culture No growth at 2 days          Labs above have been reviewed on the day of discharge.  Radiology images from prior 30 days were reviewed prior to discharge as incorporated into this document.     Discharge Vitals and Physical Examination       Vital Signs  Temp:  [97.5 °F (36.4 °C)-98.4 °F (36.9 °C)] 98 °F (36.7 °C)  Heart Rate:  [68-84] 84  Resp:  [16-20] 20  BP:  (114-133)/(54-67) 132/66     PHYSICAL EXAMINATION:   Physical Exam  Vitals and nursing note reviewed.   Constitutional:       Appearance: Normal appearance.   HENT:      Head: Normocephalic and atraumatic.      Mouth/Throat:      Mouth: Mucous membranes are moist.      Pharynx: Oropharynx is clear.   Eyes:      Extraocular Movements: Extraocular movements intact.      Pupils: Pupils are equal, round, and reactive to light.   Cardiovascular:      Rate and Rhythm: Normal rate and regular rhythm.      Heart sounds: Normal heart sounds. No murmur heard.  Pulmonary:      Effort: Pulmonary effort is normal. No respiratory distress.      Breath sounds: Normal breath sounds.   Abdominal:      General: Bowel sounds are normal.      Palpations: Abdomen is soft.   Musculoskeletal:         General: No swelling or tenderness.   Skin:     General: Skin is warm and dry.   Neurological:      Mental Status: He is alert and oriented to person, place, and time.   Psychiatric:         Mood and Affect: Mood normal.         Behavior: Behavior normal.           Discharge Disposition, Discharge Medications, and Discharge Appointments     Discharge Disposition:   home    Condition on Discharge:  Fair    Discharge Medications:     Discharge Medications      New Medications      Instructions Start Date   benzonatate 200 MG capsule  Commonly known as: TESSALON   200 mg, Oral, 3 Times Daily PRN      oseltamivir 75 MG capsule  Commonly known as: TAMIFLU   75 mg, Oral, Every 12 Hours Scheduled         Continue These Medications      Instructions Start Date   acetaminophen 650 MG 8 hr tablet  Commonly known as: TYLENOL   650 mg, Oral, Every 8 Hours PRN      amLODIPine 10 MG tablet  Commonly known as: NORVASC   10 mg, Oral, Daily      aspirin 81 MG EC tablet   81 mg, Oral, Every Other Day      atorvastatin 40 MG tablet  Commonly known as: LIPITOR   40 mg, Oral, Daily      cholecalciferol 25 MCG (1000 UT) tablet  Commonly known as: VITAMIN D3    1,000 Units, Oral, Daily      gabapentin 300 MG capsule  Commonly known as: NEURONTIN   300 mg, Oral, Nightly      losartan 50 MG tablet  Commonly known as: COZAAR   50 mg, Oral, Daily      metFORMIN 1000 MG tablet  Commonly known as: GLUCOPHAGE   1,000 mg, Oral, 2 Times Daily With Meals      metoprolol succinate XL 50 MG 24 hr tablet  Commonly known as: TOPROL-XL   50 mg, Oral, Daily      montelukast 10 MG tablet  Commonly known as: SINGULAIR   10 mg, Oral, Nightly      pantoprazole 40 MG EC tablet  Commonly known as: PROTONIX   40 mg, Oral, Daily         Stop These Medications    hydroCHLOROthiazide 12.5 MG tablet  Commonly known as: HYDRODIURIL            Discharged medication regimen discussed with attending physician prior to discharge.     Discharge Diet:   diabetic diet  Dietary Orders (From admission, onward)     Start     Ordered    03/18/22 0726  Diet Regular; Consistent Carbohydrate  Diet Effective Now        Question Answer Comment   Diet Texture / Consistency Regular    Common Modifiers Consistent Carbohydrate        03/18/22 0725 03/18/22 0726  DIET MESSAGE Requesting a diet tray for patient's family member.  Once        Comments: Requesting a diet tray for patient's family member.    03/18/22 0725                Activity at Discharge:  activity as tolerated         Discharge Disposition:    Home or Self Care        Follow-up Appointments:      Additional Instructions for the Follow-ups that You Need to Schedule     Discharge Follow-up with PCP   As directed       Currently Documented PCP:    Provider, No Known    PCP Phone Number:    [phone]     Follow Up Details: One week hospital follow-up for influenza A with hypoxia with PCP in [place] Dr. Floyd Maza            Follow-up Information     Provider, No Known .    Why: One week hospital follow-up for influenza A with hypoxia with PCP in [place] Dr. Floyd Maza  Contact information:  [facility]  [id number]  [phone]                         Additional Instructions for the Follow-ups that You Need to Schedule     Discharge Follow-up with PCP   As directed       Currently Documented PCP:    Provider, No Known    PCP Phone Number:    [phone]     Follow Up Details: One week hospital follow-up for influenza A with hypoxia with PCP in [place] Dr. Floyd Maza               Test Results Pending at Discharge:    Pending Labs     Order Current Status    Blood Culture - Blood, Arm, Left Preliminary result    Blood Culture - Blood, Arm, Right Preliminary result             Mackenzie Delacruz PA-C  [facility] Medicine Team  03/20/22  08:10 EDT      Time: Greater than 30 minutes spent on this discharge.  I spent 45 minutes on this discharge activity which included:  Face-to-face encounter with the patient, discussing plan with attending physician, reviewing the data in the system, coordination of the care with the nursing staff as well as consultations, documentation, and entering orders.

## 2022-03-21 ENCOUNTER — READMISSION MANAGEMENT (OUTPATIENT)
Dept: CALL CENTER | Facility: HOSPITAL | Age: 72
End: 2022-03-21

## 2022-03-21 ENCOUNTER — TELEPHONE (OUTPATIENT)
Dept: MEDSURG UNIT | Facility: HOSPITAL | Age: 72
End: 2022-03-21

## 2022-03-21 NOTE — PAYOR COMM NOTE
"CONTACT:   Tanvi Baig RN  Phone: [phone]  Fax: [phone]    DISCHARGE NOTIFICATION    DISCHARGE TO: home    REF # [id number]  DC DATE: 3/20/22    IF YOU NEED ANYTHING FURTHER PLEASE LET ME KNOW.   THANKS     Citlali Prince (71 y.o. Male)             Date of Birth   1950    Social Security Number       Address   [address] [address]    Home Phone   [phone]    MRN   [id number]       Christian   Yazdanism    Marital Status                               Admission Date   3/18/22    Admission Type   Emergency    Admitting Provider   Stefano Garner DO    Attending Provider       Department, Room/Bed   [place] [address]/       Discharge Date   3/20/2022    Discharge Disposition   Home or Self Care    Discharge Destination                               Attending Provider: (none)   Allergies: Benadryl [Diphenhydramine], Penicillins    Isolation: None   Infection: Influenza (03/18/22)   Code Status: Prior   Advance Care Planning Activity    Ht: 170.2 cm (67\")   Wt: 104 kg (229 lb 7 oz)    Admission Cmt: None   Principal Problem: None                Active Insurance as of 3/18/2022     Primary Coverage     Payor Plan Insurance Group Employer/Plan Group    ANTHEM MEDICARE REPLACEMENT ANTHEM MEDICARE ADVANTAGE [id number]     Payor Plan Address Payor Plan Phone Number Payor Plan Fax Number Effective Dates    [address] [phone]  1/1/2022 - None Entered    [facility] [place] [address]       Subscriber Name Subscriber Birth Date Member ID       CITLALI PRINCE 1950 [id number]                 Emergency Contacts      (Rel.) Home Phone Work Phone Mobile Phone    MARGARITA PRINCE (Spouse) [phone] -- --               Discharge Summary      Mackenzie Delacruz PA-C at 03/20/22 0801     Attestation signed by Stefano Garner DO at 03/20/22 8203    I have reviewed this documentation and agree. Pt seen and examined on the " day of discharge. Clinically improved and medically stable for discharge. Will need close outpatient follow up with PCP in [place].                       [facility] Medicine Services  DISCHARGE SUMMARY    Date of Admission: 3/18/2022    Date of Discharge:  3/20/2022    PCP: Provider, No Known    Discharging Provider: Mackenzie Delacruz PA-C  Attending Physician on day of DC: Dr. Stefano Garner    Admission Diagnosis:   Please see admission H&P    Discharge Diagnosis:   · Severe sepsis 2/2 Influenza A  · Influenza A  · Reported acute hypoxemic respiratory failure  · Severe KAILA on CPAP  · Immunosuppressed state with known CLL  · Decreased oral intake, improved  · Hyperglycemia with known DM2, NID  · CAD, chest pain free during hospitalization  · Hyperlipidemia  · Multiple hepatic cysts found incidentally on CT chest        HPI     History of Present Illness:  Bro Prince is a 71 y.o. male with past medical history significant for diabetes mellitus type 2, depression, GERD, hyperlipidemia, hypertension.  He was hospitalized in January 2022 @ [facility] in [place] with bilateral pneumonia and upper respiratory panel positive for coronavirus OC 43 in addition to charted severe obstructive sleep apnea on CPAP.  He presented to the ED of [facility] after midnight on 03/18 with flu like symptoms.  He was diagnosed with influenza A and experienced reported hypoxia in the ED with reports of drops into the 80s. He was admitted for further evaluation and treatment.     Please see admission H&P for further details.        Hospital Course     Hospital Course  Bro Prince was admitted as outlined in above HPI.     Mr. Prince was started on Tamiflu in addition to inhalant regimen and antitussives.  Supplemental oxygen was utilized with goal to titrate O2 >90%. CT chest was obtained and was unremarkable.  MRSA swab of nares was performed as well and was found to be negative.     Mr. Prince  progressed well during his hospitalization. He was titrated down to room air and oxygenated well at rest and when ambulating to the restroom. He reported significant improvement overall with breathing, body aches, and generalized weakness. He was gently hydrated while here with his HCTZ held on admission given poor oral intake and bodyaches with concern for possibly underlying dehydration.  HCTZ remained held at discharge to be restarted at the discretion of his PCP upon follow-up.      On day of discharge, Mr. Prince was ambulating independently well and breathing well on room air with adequate oral intake and reported significant improvement.     CT chest without contrast was performed on admission without known acute infiltrates but with evidence of multiple hepatic cysts.  US was mentioned for further additional information in radiology note. Will leave US of liver to the discretion of PCP. Patient without abdominal pain while hospitalized and with LFTs within normal limits.     On the day of discharge, Mr. Prince expressed he planned to return immediately home to [place]. He reported he had inhalers available to him and would not need these on discharge.  We discussed competing course of tamiflu and following up with his PCP upon discharge.        Pertinent Laboratory and Radiology Results     Pertinent Test Results:      Results from last 7 days   Lab Units 03/18/22  0324   PH, ARTERIAL pH units 7.428   PO2 ART mm Hg 61.8*   PCO2, ARTERIAL mm Hg 41.5   HCO3 ART mmol/L 27.4*     Results from last 7 days   Lab Units 03/20/22  0255 03/19/22  1047 03/19/22  0751 03/18/22  0155   WBC 10*3/mm3 4.49 5.94  --  8.57   HEMOGLOBIN g/dL 12.8* 12.7*  --  14.2   HEMATOCRIT % 39.1 38.8  --  42.7   PLATELETS 10*3/mm3 140 135*  --  140   MCV fL 90.9 90.2  --  88.8   SODIUM mmol/L 141  --  141 138   POTASSIUM mmol/L 3.7  --  3.6 3.7   CHLORIDE mmol/L 105  --  103 98   CO2 mmol/L 23.5  --  27.8 26.5   BUN mg/dL 12  --  16 12    CREATININE mg/dL 0.68*  --  0.85 1.01   GLUCOSE mg/dL 137*  --  174* 221*   CALCIUM mg/dL 8.3*  --  8.9 9.1        Results from last 7 days   Lab Units 03/18/22  0155   TROPONIN T ng/mL <0.010     Results from last 7 days   Lab Units 03/20/22  0255 03/19/22  1047 03/18/22  0459 03/18/22  0155   CRP mg/dL  --  8.00*  --  8.88*   LACTATE mmol/L  --   --  2.8* 2.3*   WBC 10*3/mm3 4.49 5.94  --  8.57     Results from last 7 days   Lab Units 03/18/22  0155   BILIRUBIN mg/dL 0.6   ALK PHOS U/L 79   ALT (SGPT) U/L 22   AST (SGOT) U/L 17           Invalid input(s): TG, LDLCALC, LDLREALC      Brief Urine Lab Results  (Last result in the past 365 days)      Color   Clarity   Blood   Leuk Est   Nitrite   Protein   CREAT   Urine HCG        03/18/22 1706 Yellow   Clear   Negative   Negative   Negative   Negative                             Pending Labs     Order Current Status    Blood Culture - Blood, Arm, Left Preliminary result    Blood Culture - Blood, Arm, Right Preliminary result            ----------------------------------------------------------------------------------------------------------------------  CT Chest Without Contrast Diagnostic    Result Date: 3/18/2022  1.  No clearly acute infiltrates demonstrated. No pleural fluid. No pneumothorax. 2.  No threshold mediastinal or hilar adenopathy. 3.  There are what is likely multiple hepatic cysts in the left lobe of the liver. Clinical aspects of the case will determine if hepatic ultrasound could provide additional information. Signer Name: Bro Fontanez MD  Signed: 3/18/2022 8:23 PM  Workstation Name: [id number]  Radiology [facility]    XR Chest 1 View    Result Date: 3/18/2022  No acute cardiopulmonary findings. Signer Name: Moses Crowder MD  Signed: 3/18/2022 4:10 AM  Workstation Name: [id number]  [facility]        Microbiology Results (last 10 days)     Procedure Component Value - Date/Time    MRSA Screen, PCR (Inpatient)  - Swab, Nares [[id number]]  (Normal) Collected: 03/19/22 0431    Lab Status: Final result Specimen: Swab from Nares Updated: 03/19/22 0543     MRSA PCR Negative     Staph aureus by PCR Negative    COVID PRE-OP / PRE-PROCEDURE SCREENING ORDER (NO ISOLATION) - Swab, Nasopharynx [[id number]]  (Abnormal) Collected: 03/18/22 0158    Lab Status: Final result Specimen: Swab from Nasopharynx Updated: 03/18/22 0230    Narrative:      The following orders were created for panel order COVID PRE-OP / PRE-PROCEDURE SCREENING ORDER (NO ISOLATION) - Swab, Nasopharynx.  Procedure                               Abnormality         Status                     ---------                               -----------         ------                     COVID-19 and FLU A/B PCR...[[id number]]  Abnormal            Final result                 Please view results for these tests on the individual orders.    COVID-19 and FLU A/B PCR - Swab, Nasopharynx [[id number]]  (Abnormal) Collected: 03/18/22 0158    Lab Status: Final result Specimen: Swab from Nasopharynx Updated: 03/18/22 0230     COVID19 Not Detected     Influenza A PCR Detected     Influenza B PCR Not Detected    Narrative:      Fact sheet for providers: https://www.fda.gov/media/239349/download    Fact sheet for patients: https://www.fda.gov/media/588664/download    Test performed by PCR.    Blood Culture - Blood, Arm, Left [[id number]]  (Normal) Collected: 03/18/22 0155    Lab Status: Preliminary result Specimen: Blood from Arm, Left Updated: 03/20/22 0201     Blood Culture No growth at 2 days    Blood Culture - Blood, Arm, Right [[id number]]  (Normal) Collected: 03/18/22 0155    Lab Status: Preliminary result Specimen: Blood from Arm, Right Updated: 03/20/22 0201     Blood Culture No growth at 2 days          Labs above have been reviewed on the day of discharge.  Radiology images from prior 30 days were reviewed prior to discharge as incorporated into this document.     Discharge Vitals and  Physical Examination       Vital Signs  Temp:  [97.5 °F (36.4 °C)-98.4 °F (36.9 °C)] 98 °F (36.7 °C)  Heart Rate:  [68-84] 84  Resp:  [16-20] 20  BP: (114-133)/(54-67) 132/66     PHYSICAL EXAMINATION:   Physical Exam  Vitals and nursing note reviewed.   Constitutional:       Appearance: Normal appearance.   HENT:      Head: Normocephalic and atraumatic.      Mouth/Throat:      Mouth: Mucous membranes are moist.      Pharynx: Oropharynx is clear.   Eyes:      Extraocular Movements: Extraocular movements intact.      Pupils: Pupils are equal, round, and reactive to light.   Cardiovascular:      Rate and Rhythm: Normal rate and regular rhythm.      Heart sounds: Normal heart sounds. No murmur heard.  Pulmonary:      Effort: Pulmonary effort is normal. No respiratory distress.      Breath sounds: Normal breath sounds.   Abdominal:      General: Bowel sounds are normal.      Palpations: Abdomen is soft.   Musculoskeletal:         General: No swelling or tenderness.   Skin:     General: Skin is warm and dry.   Neurological:      Mental Status: He is alert and oriented to person, place, and time.   Psychiatric:         Mood and Affect: Mood normal.         Behavior: Behavior normal.           Discharge Disposition, Discharge Medications, and Discharge Appointments     Discharge Disposition:   home    Condition on Discharge:  Fair    Discharge Medications:     Discharge Medications      New Medications      Instructions Start Date   benzonatate 200 MG capsule  Commonly known as: TESSALON   200 mg, Oral, 3 Times Daily PRN      oseltamivir 75 MG capsule  Commonly known as: TAMIFLU   75 mg, Oral, Every 12 Hours Scheduled         Continue These Medications      Instructions Start Date   acetaminophen 650 MG 8 hr tablet  Commonly known as: TYLENOL   650 mg, Oral, Every 8 Hours PRN      amLODIPine 10 MG tablet  Commonly known as: NORVASC   10 mg, Oral, Daily      aspirin 81 MG EC tablet   81 mg, Oral, Every Other Day       atorvastatin 40 MG tablet  Commonly known as: LIPITOR   40 mg, Oral, Daily      cholecalciferol 25 MCG (1000 UT) tablet  Commonly known as: VITAMIN D3   1,000 Units, Oral, Daily      gabapentin 300 MG capsule  Commonly known as: NEURONTIN   300 mg, Oral, Nightly      losartan 50 MG tablet  Commonly known as: COZAAR   50 mg, Oral, Daily      metFORMIN 1000 MG tablet  Commonly known as: GLUCOPHAGE   1,000 mg, Oral, 2 Times Daily With Meals      metoprolol succinate XL 50 MG 24 hr tablet  Commonly known as: TOPROL-XL   50 mg, Oral, Daily      montelukast 10 MG tablet  Commonly known as: SINGULAIR   10 mg, Oral, Nightly      pantoprazole 40 MG EC tablet  Commonly known as: PROTONIX   40 mg, Oral, Daily         Stop These Medications    hydroCHLOROthiazide 12.5 MG tablet  Commonly known as: HYDRODIURIL            Discharged medication regimen discussed with attending physician prior to discharge.     Discharge Diet:   diabetic diet  Dietary Orders (From admission, onward)     Start     Ordered    03/18/22 0726  Diet Regular; Consistent Carbohydrate  Diet Effective Now        Question Answer Comment   Diet Texture / Consistency Regular    Common Modifiers Consistent Carbohydrate        03/18/22 0725 03/18/22 0726  DIET MESSAGE Requesting a diet tray for patient's family member.  Once        Comments: Requesting a diet tray for patient's family member.    03/18/22 0725                Activity at Discharge:  activity as tolerated         Discharge Disposition:    Home or Self Care        Follow-up Appointments:      Additional Instructions for the Follow-ups that You Need to Schedule     Discharge Follow-up with PCP   As directed       Currently Documented PCP:    Provider, No Known    PCP Phone Number:    [phone]     Follow Up Details: One week hospital follow-up for influenza A with hypoxia with PCP in [place] Dr. Floyd Maza            Follow-up Information     Provider, No Known .    Why: One week  hospital follow-up for influenza A with hypoxia with PCP in [place] Dr. Floyd Maza  Contact information:  [facility]  [place] [address]  [phone]                         Additional Instructions for the Follow-ups that You Need to Schedule     Discharge Follow-up with PCP   As directed       Currently Documented PCP:    Provider, No Known    PCP Phone Number:    [phone]     Follow Up Details: One week hospital follow-up for influenza A with hypoxia with PCP in [place] Dr. Floyd Maza               Test Results Pending at Discharge:    Pending Labs     Order Current Status    Blood Culture - Blood, Arm, Left Preliminary result    Blood Culture - Blood, Arm, Right Preliminary result             Mackenzie Delacruz PA-C  [facility] Medicine Team  03/20/22  08:10 EDT      Time: Greater than 30 minutes spent on this discharge.  I spent 45 minutes on this discharge activity which included:  Face-to-face encounter with the patient, discussing plan with attending physician, reviewing the data in the system, coordination of the care with the nursing staff as well as consultations, documentation, and entering orders.               Electronically signed by Stefano Garner DO at 03/20/22 0572

## 2022-03-21 NOTE — OUTREACH NOTE
Prep Survey    Flowsheet Row Responses   [facility] facility patient discharged from? [place]   Is LACE score < 7 ? No   Emergency Room discharge w/ pulse ox? No   Eligibility Not Eligible   What are the reasons patient is not eligible? Other  [Lives in [place]]   Does the patient have one of the following disease processes/diagnoses(primary or secondary)? Sepsis   Prep survey completed? Yes          JENN HURTADO - Registered Nurse

## 2022-03-23 LAB
BACTERIA SPEC AEROBE CULT: NORMAL
BACTERIA SPEC AEROBE CULT: NORMAL

## 2022-07-19 PROBLEM — E66.3 BMI OVER RECOMMENDED: Status: ACTIVE | Noted: 2017-09-27

## 2023-02-23 ENCOUNTER — AMBULATORY SURGICAL CENTER (OUTPATIENT)
Dept: URBAN - METROPOLITAN AREA SURGERY 5 | Facility: SURGERY | Age: 73
End: 2023-02-23
Payer: MEDICARE

## 2023-02-23 ENCOUNTER — OFFICE (OUTPATIENT)
Dept: URBAN - METROPOLITAN AREA PATHOLOGY 1 | Facility: PATHOLOGY | Age: 73
End: 2023-02-23
Payer: MEDICARE

## 2023-02-23 VITALS
SYSTOLIC BLOOD PRESSURE: 155 MMHG | DIASTOLIC BLOOD PRESSURE: 76 MMHG | SYSTOLIC BLOOD PRESSURE: 183 MMHG | SYSTOLIC BLOOD PRESSURE: 146 MMHG | SYSTOLIC BLOOD PRESSURE: 154 MMHG | WEIGHT: 238 LBS | HEART RATE: 66 BPM | RESPIRATION RATE: 13 BRPM | DIASTOLIC BLOOD PRESSURE: 74 MMHG | RESPIRATION RATE: 14 BRPM | HEART RATE: 69 BPM | RESPIRATION RATE: 16 BRPM | DIASTOLIC BLOOD PRESSURE: 69 MMHG | TEMPERATURE: 97.2 F | HEART RATE: 65 BPM | OXYGEN SATURATION: 98 % | SYSTOLIC BLOOD PRESSURE: 136 MMHG | HEART RATE: 63 BPM | HEART RATE: 65 BPM | OXYGEN SATURATION: 99 % | OXYGEN SATURATION: 99 % | DIASTOLIC BLOOD PRESSURE: 89 MMHG | RESPIRATION RATE: 13 BRPM | DIASTOLIC BLOOD PRESSURE: 89 MMHG | WEIGHT: 238 LBS | HEART RATE: 66 BPM | SYSTOLIC BLOOD PRESSURE: 142 MMHG | SYSTOLIC BLOOD PRESSURE: 143 MMHG | DIASTOLIC BLOOD PRESSURE: 65 MMHG | HEART RATE: 63 BPM | OXYGEN SATURATION: 96 % | HEART RATE: 69 BPM | HEART RATE: 60 BPM | SYSTOLIC BLOOD PRESSURE: 154 MMHG | SYSTOLIC BLOOD PRESSURE: 143 MMHG | DIASTOLIC BLOOD PRESSURE: 65 MMHG | SYSTOLIC BLOOD PRESSURE: 183 MMHG | RESPIRATION RATE: 15 BRPM | OXYGEN SATURATION: 97 % | RESPIRATION RATE: 20 BRPM | DIASTOLIC BLOOD PRESSURE: 70 MMHG | RESPIRATION RATE: 14 BRPM | SYSTOLIC BLOOD PRESSURE: 138 MMHG | SYSTOLIC BLOOD PRESSURE: 126 MMHG | OXYGEN SATURATION: 96 % | SYSTOLIC BLOOD PRESSURE: 136 MMHG | SYSTOLIC BLOOD PRESSURE: 151 MMHG | HEIGHT: 68 IN | DIASTOLIC BLOOD PRESSURE: 72 MMHG | RESPIRATION RATE: 20 BRPM | DIASTOLIC BLOOD PRESSURE: 71 MMHG | RESPIRATION RATE: 17 BRPM | DIASTOLIC BLOOD PRESSURE: 69 MMHG | HEIGHT: 68 IN | DIASTOLIC BLOOD PRESSURE: 75 MMHG | SYSTOLIC BLOOD PRESSURE: 155 MMHG | TEMPERATURE: 97.2 F | DIASTOLIC BLOOD PRESSURE: 72 MMHG | OXYGEN SATURATION: 98 % | SYSTOLIC BLOOD PRESSURE: 146 MMHG | SYSTOLIC BLOOD PRESSURE: 151 MMHG | DIASTOLIC BLOOD PRESSURE: 74 MMHG | DIASTOLIC BLOOD PRESSURE: 76 MMHG | HEART RATE: 60 BPM | HEART RATE: 67 BPM | DIASTOLIC BLOOD PRESSURE: 71 MMHG | SYSTOLIC BLOOD PRESSURE: 142 MMHG | DIASTOLIC BLOOD PRESSURE: 84 MMHG | HEART RATE: 81 BPM | DIASTOLIC BLOOD PRESSURE: 70 MMHG | RESPIRATION RATE: 16 BRPM | RESPIRATION RATE: 17 BRPM | HEART RATE: 67 BPM | SYSTOLIC BLOOD PRESSURE: 138 MMHG | OXYGEN SATURATION: 97 % | RESPIRATION RATE: 18 BRPM | DIASTOLIC BLOOD PRESSURE: 84 MMHG | SYSTOLIC BLOOD PRESSURE: 134 MMHG | SYSTOLIC BLOOD PRESSURE: 126 MMHG | DIASTOLIC BLOOD PRESSURE: 75 MMHG | RESPIRATION RATE: 18 BRPM | HEART RATE: 81 BPM | SYSTOLIC BLOOD PRESSURE: 134 MMHG | RESPIRATION RATE: 15 BRPM

## 2023-02-23 DIAGNOSIS — D12.4 BENIGN NEOPLASM OF DESCENDING COLON: ICD-10-CM

## 2023-02-23 DIAGNOSIS — Z86.010 PERSONAL HISTORY OF COLONIC POLYPS: ICD-10-CM

## 2023-02-23 DIAGNOSIS — Z09 ENCOUNTER FOR FOLLOW-UP EXAMINATION AFTER COMPLETED TREATMEN: ICD-10-CM

## 2023-02-23 DIAGNOSIS — D12.2 BENIGN NEOPLASM OF ASCENDING COLON: ICD-10-CM

## 2023-02-23 DIAGNOSIS — K57.30 DIVERTICULOSIS OF LARGE INTESTINE WITHOUT PERFORATION OR ABS: ICD-10-CM

## 2023-02-23 PROBLEM — K63.5 POLYP OF COLON: Status: ACTIVE | Noted: 2023-02-23

## 2023-02-23 PROCEDURE — 88305 TISSUE EXAM BY PATHOLOGIST: CPT | Performed by: PATHOLOGY

## 2023-02-23 PROCEDURE — 45385 COLONOSCOPY W/LESION REMOVAL: CPT | Performed by: INTERNAL MEDICINE

## 2023-02-28 LAB
PDF: PDF REPORT: (no result)
PDF: PDF REPORT: (no result)